# Patient Record
Sex: FEMALE | Race: ASIAN | NOT HISPANIC OR LATINO
[De-identification: names, ages, dates, MRNs, and addresses within clinical notes are randomized per-mention and may not be internally consistent; named-entity substitution may affect disease eponyms.]

---

## 2017-11-01 ENCOUNTER — RESULT REVIEW (OUTPATIENT)
Age: 42
End: 2017-11-01

## 2020-11-09 ENCOUNTER — RESULT REVIEW (OUTPATIENT)
Age: 45
End: 2020-11-09

## 2021-08-25 ENCOUNTER — APPOINTMENT (OUTPATIENT)
Dept: SURGICAL ONCOLOGY | Facility: CLINIC | Age: 46
End: 2021-08-25
Payer: MEDICAID

## 2021-08-25 VITALS
WEIGHT: 127 LBS | BODY MASS INDEX: 24.94 KG/M2 | HEART RATE: 91 BPM | OXYGEN SATURATION: 97 % | RESPIRATION RATE: 16 BRPM | HEIGHT: 60 IN | SYSTOLIC BLOOD PRESSURE: 148 MMHG | DIASTOLIC BLOOD PRESSURE: 82 MMHG

## 2021-08-25 DIAGNOSIS — Z78.9 OTHER SPECIFIED HEALTH STATUS: ICD-10-CM

## 2021-08-25 DIAGNOSIS — D24.1 BENIGN NEOPLASM OF RIGHT BREAST: ICD-10-CM

## 2021-08-25 PROCEDURE — 99204 OFFICE O/P NEW MOD 45 MIN: CPT

## 2021-08-25 RX ORDER — AMLODIPINE BESYLATE 5 MG/1
TABLET ORAL
Refills: 0 | Status: ACTIVE | COMMUNITY

## 2021-08-25 NOTE — PHYSICAL EXAM
[Normocephalic] : normocephalic [Atraumatic] : atraumatic [EOMI] : extra ocular movement intact [PERRL] : pupils equal, round and reactive to light [Sclera nonicteric] : sclera nonicteric [Supple] : supple [No Supraclavicular Adenopathy] : no supraclavicular adenopathy [No Cervical Adenopathy] : no cervical adenopathy [Examined in the supine and seated position] : examined in the supine and seated position [Symmetrical] : symmetrical [Bra Size: ___] : Bra Size: [unfilled] [Grade 2] : Ptosis Grade 2 [No dominant masses] : no dominant masses left breast [No Nipple Retraction] : no left nipple retraction [No Nipple Discharge] : no left nipple discharge [Breast Nipple Inversion] : nipples not inverted [Breast Nipple Retraction] : nipples not retracted [Breast Nipple Flattening] : nipples flattened [Breast Nipple Fissures] : nipples not fissured [Breast Abnormal Lactation (Galactorrhea)] : no galactorrhea [Breast Abnormal Secretion Bloody Fluid] : no bloody discharge [Breast Abnormal Secretion Serous Fluid] : no serous discharge [Breast Abnormal Secretion Opalescent Fluid] : no milky discharge [No Axillary Lymphadenopathy] : no left axillary lymphadenopathy [No Edema] : no edema [No Swelling] : no swelling [Full ROM] : full range of motion [de-identified] : non-labored respirations  [de-identified] : superficial accessory fatty tissue in axilla [de-identified] : 1-2 cm mass in lower inner breast

## 2021-08-25 NOTE — HISTORY OF PRESENT ILLNESS
[FreeTextEntry1] : The patient is a 46 year old female referred for consultation by Dr. Melquiades Villarreal for Right breast ADH\par \par The patient reports that she's noticed a right breast mass for approximately 20 years that has not changed. She underwent recent screening imaging without any breast complaints.\par \par Most recent imaging:\par 5/26/2021 B/L SM ([facility]) revealed heterogeneously dense breasts \par  - R 5:00 N6 multilobulated mass 21 x 24 mm, unchanged\par  - R lower outer breast 6 x 8 mm, faint nodule\par  - L negative\par \par 5/26/2021 B/L US\par  - R 12:00 N5 3 x 5 x 2 mm cyst -> 6 mo f/u\par  - R 5:00 N4 2.4 x 1.1 x 2.1 cm hypoechoic mass ->bx\par  - R 8:00 N5 8 x 5 x 8 mm hypoechoic mass, complicated cyst vs FA -> 6 mo F/u\par  - R 11:00 N4 6 x 5 x 6 mm hypoechoic nodule -> bx\par  - L 8:00 N7 3 mm cyst\par  - BR4\par \par 7/12/2021 R USG-CNB ([facility])\par  - R 5:00 N4 (R clip) - ADH\par  - R 11:00 N4 (S clip) - FA\par \par Today, the patient reports no breast complaints. Denies pain, masses, skin changes, or nipple discharge.

## 2021-08-25 NOTE — ASSESSMENT
[FreeTextEntry1] : The patient is a 46 year old female with R breast ADH and FA found on core needle biopsy.\par \par We discussed the implications of atypical hyperplasia at length in the office today, first discussing the increased breast cancer risk, which results in a 2-3 fold increase lifelong and implications it has for her in terms of high-risk screening. Patients should undergo yearly imaging and clinical exam, and consideration can be given to the additional use of breast MRI screening. The patient should also meet with a Medical Oncologist to consider the risks and benefits to taking oral breast cancer- risk-reducing medications.\par \par We discussed that upon surgical excision 15-20% of cases are upgraded on excision, and thus excision is currently recommended.\par  \par We discussed that atypical hyperplasia is not a pre-cancerous or cancerous condition, but is an indication for breast cancer risk.\par \par For the fibroadenoma, we discussed that fibroadenomas are the most common benign tumor in the breast, accounting for one-half of all breast biopsies.\par We discussed indications for observation vs. excision of fibroadenomas. If a breast mass is benign on radiologic appearance, clinical exam and needle biopsy, it is safe to observe these lesions with repeat imaging q6 months x2 years--if the lesion remains unchanged or regresses, at that time the patient can be safely returned to routine breast care. If the lesion grows or changes in configuration, excision is recommended, as it also is for cases where there is discordance between these three characteristics.\par  \par We discussed that fibroadenomas are not signs of unusual breast activity or breast cancer risk. \par \par While the mass is palpable on exam, it is small and difficult to appreciate, I discussed that we will place a MagSeed to help with definitive localization during surgery.\par \par Preoperative, intraoperative, and postoperative considerations were reviewed.  Risks, benefits, and alternatives to proposed surgical procedure were reviewed and all questions were answered to her satisfaction.\par \par Plan:\par  - Magseed\par  - path/slide review requested\par  - R breast excisional biopsy with Magseed\par

## 2021-08-25 NOTE — PAST MEDICAL HISTORY
[Perimenopausal] : The patient is perimenopausal [Menarche Age ____] : age at menarche was [unfilled] [History of Hormone Replacement Treatment] : has no history of hormone replacement treatment [Approximately ___] : the LMP was approximately [unfilled] [Irregular Cycle Intervals] : are  irregular [Total Preg ___] : G[unfilled] [Live Births ___] : P[unfilled]  [Age At Live Birth ___] : Age at live birth: [unfilled] [FreeTextEntry5] : 3 c-sections [FreeTextEntry2] : 3 children, 17, 15, and 6 (special needs) [FreeTextEntry6] : none [FreeTextEntry7] : none [FreeTextEntry8] : 2 months

## 2021-08-25 NOTE — CONSULT LETTER
[Dear  ___] : Dear  [unfilled], [Consult Letter:] : I had the pleasure of evaluating your patient, [unfilled]. [Please see my note below.] : Please see my note below. [Consult Closing:] : Thank you very much for allowing me to participate in the care of this patient.  If you have any questions, please do not hesitate to contact me. [Sincerely,] : Sincerely, [FreeTextEntry3] : Balbina Forrester MD\par Breast Surgeon\par Division of Surgical Oncology\par Department of Surgery\par [address] [place] [address]\[place] [address] \par Tel: [phone]\par Fax: [phone]\par Email: [email or web address]

## 2021-09-02 ENCOUNTER — OUTPATIENT (OUTPATIENT)
Dept: OUTPATIENT SERVICES | Facility: HOSPITAL | Age: 46
LOS: 1 days | End: 2021-09-02
Payer: MEDICAID

## 2021-09-02 ENCOUNTER — RESULT REVIEW (OUTPATIENT)
Age: 46
End: 2021-09-02

## 2021-09-02 DIAGNOSIS — N60.91 UNSPECIFIED BENIGN MAMMARY DYSPLASIA OF RIGHT BREAST: ICD-10-CM

## 2021-09-02 DIAGNOSIS — D24.1 BENIGN NEOPLASM OF RIGHT BREAST: ICD-10-CM

## 2021-09-02 PROCEDURE — 88321 CONSLTJ&REPRT SLD PREP ELSWR: CPT

## 2021-09-15 ENCOUNTER — OUTPATIENT (OUTPATIENT)
Dept: OUTPATIENT SERVICES | Facility: HOSPITAL | Age: 46
LOS: 1 days | End: 2021-09-15
Payer: MEDICAID

## 2021-09-15 VITALS
RESPIRATION RATE: 16 BRPM | HEIGHT: 58.5 IN | OXYGEN SATURATION: 99 % | SYSTOLIC BLOOD PRESSURE: 140 MMHG | TEMPERATURE: 97 F | DIASTOLIC BLOOD PRESSURE: 80 MMHG | HEART RATE: 80 BPM | WEIGHT: 128.97 LBS

## 2021-09-15 DIAGNOSIS — T78.40XA ALLERGY, UNSPECIFIED, INITIAL ENCOUNTER: ICD-10-CM

## 2021-09-15 DIAGNOSIS — Z98.891 HISTORY OF UTERINE SCAR FROM PREVIOUS SURGERY: Chronic | ICD-10-CM

## 2021-09-15 DIAGNOSIS — N63.10 UNSPECIFIED LUMP IN THE RIGHT BREAST, UNSPECIFIED QUADRANT: ICD-10-CM

## 2021-09-15 DIAGNOSIS — Z87.01 PERSONAL HISTORY OF PNEUMONIA (RECURRENT): Chronic | ICD-10-CM

## 2021-09-15 DIAGNOSIS — I10 ESSENTIAL (PRIMARY) HYPERTENSION: ICD-10-CM

## 2021-09-15 DIAGNOSIS — N60.91 UNSPECIFIED BENIGN MAMMARY DYSPLASIA OF RIGHT BREAST: ICD-10-CM

## 2021-09-15 LAB
ANION GAP SERPL CALC-SCNC: 13 MMOL/L — SIGNIFICANT CHANGE UP (ref 7–14)
BUN SERPL-MCNC: 11 MG/DL — SIGNIFICANT CHANGE UP (ref 7–23)
CALCIUM SERPL-MCNC: 10.4 MG/DL — SIGNIFICANT CHANGE UP (ref 8.4–10.5)
CHLORIDE SERPL-SCNC: 101 MMOL/L — SIGNIFICANT CHANGE UP (ref 98–107)
CO2 SERPL-SCNC: 27 MMOL/L — SIGNIFICANT CHANGE UP (ref 22–31)
CREAT SERPL-MCNC: 0.65 MG/DL — SIGNIFICANT CHANGE UP (ref 0.5–1.3)
GLUCOSE SERPL-MCNC: 107 MG/DL — HIGH (ref 70–99)
HCG SERPL-ACNC: <5 MIU/ML — SIGNIFICANT CHANGE UP
HCG UR QL: NEGATIVE — SIGNIFICANT CHANGE UP
HCT VFR BLD CALC: 36.5 % — SIGNIFICANT CHANGE UP (ref 34.5–45)
HGB BLD-MCNC: 12.9 G/DL — SIGNIFICANT CHANGE UP (ref 11.5–15.5)
MCHC RBC-ENTMCNC: 28.9 PG — SIGNIFICANT CHANGE UP (ref 27–34)
MCHC RBC-ENTMCNC: 35.3 GM/DL — SIGNIFICANT CHANGE UP (ref 32–36)
MCV RBC AUTO: 81.8 FL — SIGNIFICANT CHANGE UP (ref 80–100)
NRBC # BLD: 0 /100 WBCS — SIGNIFICANT CHANGE UP
NRBC # FLD: 0 K/UL — SIGNIFICANT CHANGE UP
PLATELET # BLD AUTO: 316 K/UL — SIGNIFICANT CHANGE UP (ref 150–400)
POTASSIUM SERPL-MCNC: 3 MMOL/L — LOW (ref 3.5–5.3)
POTASSIUM SERPL-SCNC: 3 MMOL/L — LOW (ref 3.5–5.3)
RBC # BLD: 4.46 M/UL — SIGNIFICANT CHANGE UP (ref 3.8–5.2)
RBC # FLD: 13.6 % — SIGNIFICANT CHANGE UP (ref 10.3–14.5)
SODIUM SERPL-SCNC: 141 MMOL/L — SIGNIFICANT CHANGE UP (ref 135–145)
WBC # BLD: 7.13 K/UL — SIGNIFICANT CHANGE UP (ref 3.8–10.5)
WBC # FLD AUTO: 7.13 K/UL — SIGNIFICANT CHANGE UP (ref 3.8–10.5)

## 2021-09-15 PROCEDURE — 93010 ELECTROCARDIOGRAM REPORT: CPT

## 2021-09-15 NOTE — H&P PST ADULT - NSICDXPASTMEDICALHX_GEN_ALL_CORE_FT
PAST MEDICAL HISTORY:  COVID 2020    History of root canal procedure 21 ; pt rescribed amoxicillin dc d day  due to " pimples on my tongue "    Hypertension     Pneumonia 2015 s/p  section     PAST MEDICAL HISTORY:  COVID 2020    History of root canal procedure 21 ; pt prescribed amoxicillin dc d day  due to " pimples on my tongue "    Hypertension     Pneumonia 2015 s/p  section

## 2021-09-15 NOTE — H&P PST ADULT - HISTORY OF PRESENT ILLNESS
Pt is a 46 y.o. female ; pt s/p mammogram, s/p sonogram ; pt reports right breast lump ; pt to surgeon ; pt now presents for Right Breast Excisional Biopsy with magseed Localization    Pt denies breast pain , denies nipple discharge  Pt is a 46 y.o. female ; pt s/p mammogram, s/p sonogram ; pt reports right breast lump ; pt to surgeon ; pt now presents for Right Breast Excisional Biopsy with Magseed Localization    Pt denies breast pain , denies nipple discharge

## 2021-09-15 NOTE — H&P PST ADULT - NSSUBSTANCEUSE_GEN_ALL_CORE_SD
Dr. Josh Hooker, please advise on Dexa Scan results. Patient is requesting to speak to you about them. Thanks! pt denies drug use

## 2021-09-15 NOTE — H&P PST ADULT - BREASTS COMMENTS
right breast mass ; surgeon wi9th original studies ; per pt unable to palpate right breast mass ; surgeon with original studies ; per pt unable to palpate

## 2021-09-15 NOTE — H&P PST ADULT - NSANTHOSAYNRD_GEN_A_CORE
No. OTTO screening performed.  STOP BANG Legend: 0-2 = LOW Risk; 3-4 = INTERMEDIATE Risk; 5-8 = HIGH Risk

## 2021-09-15 NOTE — H&P PST ADULT - PROBLEM SELECTOR PLAN 1
Right Breast Excisional Biopsy with Magseed Localization    Pre op instructions reviewed with pt ; pt verbalized good understanding of pre op instructions    Pt aware Covid test to be scheduled

## 2021-09-20 ENCOUNTER — APPOINTMENT (OUTPATIENT)
Dept: DISASTER EMERGENCY | Facility: CLINIC | Age: 46
End: 2021-09-20

## 2021-09-20 ENCOUNTER — LABORATORY RESULT (OUTPATIENT)
Age: 46
End: 2021-09-20

## 2021-09-20 PROBLEM — U07.1 COVID-19: Chronic | Status: ACTIVE | Noted: 2021-09-15

## 2021-09-20 PROBLEM — J18.9 PNEUMONIA, UNSPECIFIED ORGANISM: Chronic | Status: ACTIVE | Noted: 2021-09-15

## 2021-09-20 PROBLEM — I10 ESSENTIAL (PRIMARY) HYPERTENSION: Chronic | Status: ACTIVE | Noted: 2021-09-15

## 2021-09-20 PROBLEM — Z98.890 OTHER SPECIFIED POSTPROCEDURAL STATES: Chronic | Status: ACTIVE | Noted: 2021-09-15

## 2021-09-22 ENCOUNTER — RESULT REVIEW (OUTPATIENT)
Age: 46
End: 2021-09-22

## 2021-09-22 ENCOUNTER — OUTPATIENT (OUTPATIENT)
Dept: OUTPATIENT SERVICES | Facility: HOSPITAL | Age: 46
LOS: 1 days | End: 2021-09-22
Payer: MEDICAID

## 2021-09-22 ENCOUNTER — APPOINTMENT (OUTPATIENT)
Dept: ULTRASOUND IMAGING | Facility: IMAGING CENTER | Age: 46
End: 2021-09-22
Payer: MEDICAID

## 2021-09-22 ENCOUNTER — TRANSCRIPTION ENCOUNTER (OUTPATIENT)
Age: 46
End: 2021-09-22

## 2021-09-22 VITALS
TEMPERATURE: 97 F | DIASTOLIC BLOOD PRESSURE: 74 MMHG | OXYGEN SATURATION: 99 % | SYSTOLIC BLOOD PRESSURE: 117 MMHG | WEIGHT: 128.97 LBS | HEIGHT: 58.5 IN | HEART RATE: 82 BPM | RESPIRATION RATE: 16 BRPM

## 2021-09-22 DIAGNOSIS — Z98.891 HISTORY OF UTERINE SCAR FROM PREVIOUS SURGERY: Chronic | ICD-10-CM

## 2021-09-22 DIAGNOSIS — Z00.8 ENCOUNTER FOR OTHER GENERAL EXAMINATION: ICD-10-CM

## 2021-09-22 PROCEDURE — 19285 PERQ DEV BREAST 1ST US IMAG: CPT

## 2021-09-22 PROCEDURE — 19285 PERQ DEV BREAST 1ST US IMAG: CPT | Mod: RT

## 2021-09-22 PROCEDURE — C1739: CPT

## 2021-09-23 ENCOUNTER — OUTPATIENT (OUTPATIENT)
Dept: OUTPATIENT SERVICES | Facility: HOSPITAL | Age: 46
LOS: 1 days | Discharge: HOME | End: 2021-09-23
Payer: MEDICAID

## 2021-09-23 ENCOUNTER — RESULT REVIEW (OUTPATIENT)
Age: 46
End: 2021-09-23

## 2021-09-23 ENCOUNTER — APPOINTMENT (OUTPATIENT)
Dept: MAMMOGRAPHY | Facility: IMAGING CENTER | Age: 46
End: 2021-09-23
Payer: MEDICAID

## 2021-09-23 ENCOUNTER — APPOINTMENT (OUTPATIENT)
Dept: SURGICAL ONCOLOGY | Facility: AMBULATORY SURGERY CENTER | Age: 46
End: 2021-09-23

## 2021-09-23 ENCOUNTER — OUTPATIENT (OUTPATIENT)
Dept: OUTPATIENT SERVICES | Facility: HOSPITAL | Age: 46
LOS: 1 days | End: 2021-09-23
Payer: COMMERCIAL

## 2021-09-23 VITALS
TEMPERATURE: 98 F | DIASTOLIC BLOOD PRESSURE: 82 MMHG | HEART RATE: 81 BPM | SYSTOLIC BLOOD PRESSURE: 116 MMHG | OXYGEN SATURATION: 96 % | RESPIRATION RATE: 15 BRPM

## 2021-09-23 DIAGNOSIS — N60.91 UNSPECIFIED BENIGN MAMMARY DYSPLASIA OF RIGHT BREAST: ICD-10-CM

## 2021-09-23 DIAGNOSIS — Z00.8 ENCOUNTER FOR OTHER GENERAL EXAMINATION: ICD-10-CM

## 2021-09-23 DIAGNOSIS — Z98.891 HISTORY OF UTERINE SCAR FROM PREVIOUS SURGERY: Chronic | ICD-10-CM

## 2021-09-23 LAB
POTASSIUM SERPL-MCNC: 3 MMOL/L — LOW (ref 3.5–5.3)
POTASSIUM SERPL-SCNC: 3 MMOL/L — LOW (ref 3.5–5.3)

## 2021-09-23 PROCEDURE — 76098 X-RAY EXAM SURGICAL SPECIMEN: CPT | Mod: 26

## 2021-09-23 PROCEDURE — 88307 TISSUE EXAM BY PATHOLOGIST: CPT | Mod: 26

## 2021-09-23 PROCEDURE — 76098 X-RAY EXAM SURGICAL SPECIMEN: CPT

## 2021-09-23 PROCEDURE — 88360 TUMOR IMMUNOHISTOCHEM/MANUAL: CPT | Mod: 26

## 2021-09-23 PROCEDURE — 19125 EXCISION BREAST LESION: CPT

## 2021-09-23 RX ORDER — AMOXICILLIN 250 MG/5ML
1 SUSPENSION, RECONSTITUTED, ORAL (ML) ORAL
Qty: 0 | Refills: 0 | DISCHARGE

## 2021-09-23 RX ORDER — AMLODIPINE BESYLATE 2.5 MG/1
1 TABLET ORAL
Qty: 0 | Refills: 0 | DISCHARGE

## 2021-09-23 NOTE — ASU DISCHARGE PLAN (ADULT/PEDIATRIC) - MODE OF TRANSPORTATION
Detail Level: Detailed
Initiate Treatment: She has fluorouracil 5% BID x 2 weeks to the chest
Ambulatory

## 2021-09-23 NOTE — BRIEF OPERATIVE NOTE - NSICDXBRIEFPREOP_GEN_ALL_CORE_FT
PRE-OP DIAGNOSIS:  Atypical ductal hyperplasia of right breast 23-Sep-2021 16:45:16  Lynne Cardozo

## 2021-09-23 NOTE — ASU DISCHARGE PLAN (ADULT/PEDIATRIC) - ASU DC SPECIAL INSTRUCTIONSFT
Please make an appointment to see Dr. Forrester in 1- 2 weeks.    You may take Tylenol as needed for pain control.

## 2021-09-23 NOTE — ASU DISCHARGE PLAN (ADULT/PEDIATRIC) - CARE PROVIDER_API CALL
Balbina Forrester)  Surgery  [address] [place] [id number]  Phone: [phone]  Fax: [phone]  Follow Up Time:

## 2021-09-28 PROBLEM — N60.91 ATYPICAL DUCTAL HYPERPLASIA OF RIGHT BREAST: Status: RESOLVED | Noted: 2021-08-25 | Resolved: 2021-09-28

## 2021-09-29 ENCOUNTER — APPOINTMENT (OUTPATIENT)
Dept: SURGICAL ONCOLOGY | Facility: CLINIC | Age: 46
End: 2021-09-29
Payer: MEDICAID

## 2021-09-29 VITALS
DIASTOLIC BLOOD PRESSURE: 81 MMHG | HEART RATE: 94 BPM | WEIGHT: 127 LBS | BODY MASS INDEX: 24.94 KG/M2 | OXYGEN SATURATION: 97 % | HEIGHT: 60 IN | SYSTOLIC BLOOD PRESSURE: 148 MMHG | RESPIRATION RATE: 17 BRPM

## 2021-09-29 DIAGNOSIS — R21 RASH AND OTHER NONSPECIFIC SKIN ERUPTION: ICD-10-CM

## 2021-09-29 DIAGNOSIS — N60.91 UNSPECIFIED BENIGN MAMMARY DYSPLASIA OF RIGHT BREAST: ICD-10-CM

## 2021-09-29 LAB — SURGICAL PATHOLOGY STUDY: SIGNIFICANT CHANGE UP

## 2021-09-29 PROCEDURE — 99024 POSTOP FOLLOW-UP VISIT: CPT

## 2021-09-29 RX ORDER — HYDROCORTISONE 1 G/100G
1 CREAM TOPICAL TWICE DAILY
Qty: 1 | Refills: 0 | Status: ACTIVE | COMMUNITY
Start: 2021-09-29 | End: 1900-01-01

## 2021-09-29 NOTE — CONSULT LETTER
[Dear  ___] : Dear  [unfilled], [Courtesy Letter:] : I had the pleasure of seeing your patient, [unfilled], in my office today. [Please see my note below.] : Please see my note below. [Consult Closing:] : Thank you very much for allowing me to participate in the care of this patient.  If you have any questions, please do not hesitate to contact me. [Sincerely,] : Sincerely, [FreeTextEntry3] : Balbina Forrester MD\par Breast Surgeon\par Division of Surgical Oncology\par Department of Surgery\par [address] [place] [address]\[place] [address] \par Tel: [phone]\par Fax: [phone]\par Email: [email or web address]

## 2021-09-29 NOTE — ASSESSMENT
[FreeTextEntry1] : The patient is a 46 year old female with R breast ADH s/p excisional biopsy with Magseed localization 9/23/2021.\par \par Postop course noted for some bleeding from incision, stopped with pressure. No evidence of hematoma or seroma today.\par \par Path still pending.\par \par Plan:\par  - will call with pathology results\par  - R US 11/21\par  - 5/2022 B/L SM and US\par  - RTO after imaging\par \par

## 2021-09-29 NOTE — HISTORY OF PRESENT ILLNESS
[FreeTextEntry1] : The patient is a 46 year old female with Right breast ADH s/p R excisional biopsy with MagSeed Localization 9/23/2021.\par \par Prior history:\par The patient reports that she's noticed a right breast mass for approximately 20 years that has not changed. She underwent recent screening imaging without any breast complaints.\par \par Most recent imaging:\par 5/26/2021 B/L SM ([facility]) revealed heterogeneously dense breasts \par  - R 5:00 N6 multilobulated mass 21 x 24 mm, unchanged\par  - R lower outer breast 6 x 8 mm, faint nodule\par  - L negative\par \par 5/26/2021 B/L US\par  - R 12:00 N5 3 x 5 x 2 mm cyst -> 6 mo f/u\par  - R 5:00 N4 2.4 x 1.1 x 2.1 cm hypoechoic mass ->bx\par  - R 8:00 N5 8 x 5 x 8 mm hypoechoic mass, complicated cyst vs FA -> 6 mo F/u\par  - R 11:00 N4 6 x 5 x 6 mm hypoechoic nodule -> bx\par  - L 8:00 N7 3 mm cyst\par  - BR4\par \par 7/12/2021 R USG-CNB ([facility])\par  - R 5:00 N4 (R clip) - ADH\par  - R 11:00 N4 (S clip) - FA\par \par 9/23/2021 R breast excisional biopsy with Magseed localization: Path pending\par \par Interval history:\par  - The patient reports that the day after surgery, she noticed bleeding from the surgical site underneath her dressing. She was too nervous to take off the dressing, but held pressure. She was unable to contact anyone at the office (tried to call, but did not get through, did not speak to emergency line). The bleeding did not continue so the patient kept her postop appointment. Denies fevers, chills. Was taking Tylenol once a day for pain, but no longer needing that. \par \par The patient also notes a rash over the area of surgery-itchy.

## 2021-09-29 NOTE — PHYSICAL EXAM
[Normocephalic] : normocephalic [Atraumatic] : atraumatic [EOMI] : extra ocular movement intact [PERRL] : pupils equal, round and reactive to light [Sclera nonicteric] : sclera nonicteric [Supple] : supple [No Supraclavicular Adenopathy] : no supraclavicular adenopathy [No Cervical Adenopathy] : no cervical adenopathy [de-identified] : non-labored respirations  [de-identified] : inferior circumareolar incision intact, hematoma noted on top of skin underneath surgical dressing. No active bleeding at this time. Some ecchymosis, no seroma, no fluctance. No erythema

## 2021-10-05 ENCOUNTER — NON-APPOINTMENT (OUTPATIENT)
Age: 46
End: 2021-10-05

## 2021-10-09 ENCOUNTER — OUTPATIENT (OUTPATIENT)
Dept: OUTPATIENT SERVICES | Facility: HOSPITAL | Age: 46
LOS: 1 days | Discharge: HOME | End: 2021-10-09

## 2021-10-09 DIAGNOSIS — Z98.891 HISTORY OF UTERINE SCAR FROM PREVIOUS SURGERY: Chronic | ICD-10-CM

## 2021-10-09 DIAGNOSIS — D64.9 ANEMIA, UNSPECIFIED: ICD-10-CM

## 2021-10-13 ENCOUNTER — APPOINTMENT (OUTPATIENT)
Dept: HEMATOLOGY ONCOLOGY | Facility: CLINIC | Age: 46
End: 2021-10-13
Payer: MEDICAID

## 2021-10-13 VITALS
BODY MASS INDEX: 24.99 KG/M2 | SYSTOLIC BLOOD PRESSURE: 134 MMHG | OXYGEN SATURATION: 99 % | HEIGHT: 59.45 IN | HEART RATE: 90 BPM | RESPIRATION RATE: 16 BRPM | WEIGHT: 125.64 LBS | DIASTOLIC BLOOD PRESSURE: 78 MMHG | TEMPERATURE: 97.7 F

## 2021-10-13 PROCEDURE — 99205 OFFICE O/P NEW HI 60 MIN: CPT

## 2021-10-19 ENCOUNTER — APPOINTMENT (OUTPATIENT)
Dept: RADIATION ONCOLOGY | Facility: CLINIC | Age: 46
End: 2021-10-19

## 2021-10-19 NOTE — HISTORY OF PRESENT ILLNESS
[T: ___] : T[unfilled] [N: ___] : N[unfilled] [M: ___] : M[unfilled] [AJCC Stage: ____] : AJCC Stage: [unfilled] [de-identified] : Ms.Parvin Hernadez is a 46 year old female here for an evaluation of breast cancer. Her oncologic history is as follows:\par \par She underwent routine breast imaging on 5/26/21 (BIRADS 4) the mammo showed heterogeneously dense breasts, a  right 5:00 N6 multilobulated mass 21 x 24 mm, unchanged and  a right lower outer breast 6 x 8 mm, faint nodule. The left breast was  negative. The US revealed multiple right breast masses. The largest nodule on the right is at 5:00 4 cm FN and there is a  nodule on the right at 11:00 4 cm FN for which biopsies are rec. 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

## 2021-10-19 NOTE — PHYSICAL EXAM
[Fully active, able to carry on all pre-disease performance without restriction] : Status 0 - Fully active, able to carry on all pre-disease performance without restriction [Normal] : bilateral breasts without nipple retraction, skin dimpling or palpable masses; the bilateral axillae are without adenopathy [de-identified] : healed lumpectomy scar

## 2021-10-19 NOTE — OB HISTORY
[Regular Cycle Intervals] : periods have been regular [Currently In Menopause] : not currently in menopause

## 2021-10-19 NOTE — REASON FOR VISIT
[Other: _____] : [unfilled] [[employer] ] : provided by [employer]   [FreeTextEntry2] : ER/MA + Right breast Ductal carcinoma in situ  [FreeTextEntry3] : ISRAT [id number]

## 2021-10-25 ENCOUNTER — APPOINTMENT (OUTPATIENT)
Dept: MRI IMAGING | Facility: CLINIC | Age: 46
End: 2021-10-25

## 2021-11-10 ENCOUNTER — NON-APPOINTMENT (OUTPATIENT)
Age: 46
End: 2021-11-10

## 2022-01-22 ENCOUNTER — OUTPATIENT (OUTPATIENT)
Dept: OUTPATIENT SERVICES | Facility: HOSPITAL | Age: 47
LOS: 1 days | Discharge: HOME | End: 2022-01-22

## 2022-01-22 DIAGNOSIS — Z98.891 HISTORY OF UTERINE SCAR FROM PREVIOUS SURGERY: Chronic | ICD-10-CM

## 2022-01-22 DIAGNOSIS — D64.9 ANEMIA, UNSPECIFIED: ICD-10-CM

## 2022-01-25 ENCOUNTER — LABORATORY RESULT (OUTPATIENT)
Age: 47
End: 2022-01-25

## 2022-01-25 ENCOUNTER — APPOINTMENT (OUTPATIENT)
Dept: HEMATOLOGY ONCOLOGY | Facility: CLINIC | Age: 47
End: 2022-01-25

## 2022-01-25 NOTE — DISCUSSION/SUMMARY
[FreeTextEntry1] : REASON FOR CONSULT\par Zoraida Hernadez is a 47-year-old female who was referred by Dr. Maria Del Carmen Zhu for cancer genetic counseling and risk assessment due to a recent diagnosis of breast cancer. \par \par RELEVANT MEDICAL HISTORY\par Ms. Hernadez was diagnosed with right breast cancer (DCIS, ER+/NH+) in 2021 at age 46. She was treated with excisional biopsy 2021. \par \par Radiation oncology consult was requested by Dr. Zhu. Ms. Hernadez previously cancelled this appointment stating she does not want additional therapy as she feels fine and needs to take care of her son with special needs. Ms. Hernadez was encouraged to schedule the follow-up to discuss treatment, she stated she will reschedule the rad onc appointment. In addition, Ms. Hernadez stated she is taking pomegranate seed oil supplements instead of Tamoxifen. She was encouraged to follow-up with Dr. Zhu regarding Tamoxifen use. \par \par OTHER MEDICAL AND SURGICAL HISTORY:\par HTN. C-sections x3. Right breast biopsies 2021 – ADH involving fibroadenoma and fibroadenoma. \par \par PAST OB/GYN HISTORY:\par Obstetrical History: \par Age at Menarche: 8 or 9\par Perimenopausal/Unsure (LMP 2021) \par Age at First Live Birth: 27\par Oral Contraceptive Use: No\par Hormone Replacement Therapy: No\par \par CANCER SCREENING HISTORY:  \par GYN: Last visit last week, normal. Frequency: yearly\par Colon: None. \par Skin: None. No concerns reported today. \par \par SOCIAL HISTORY:\par •	\par •	Tobacco-product use: No\par \par FAMILY HISTORY:\par Maternal and paternal ancestry was reported as [place]. No Ashkenazi Advent heritage reported. A detailed family history of cancer was ascertained, see below and scanned chart for pedigree. \par \par To Ms. Hernadez’s knowledge, no one in the family has had germline testing for cancer susceptibility.  \par 	\par 	RISK ASSESSMENT:\par Ms. Hernadez’s recent diagnosis of breast cancer may be suggestive of more than one hereditary cancer predisposition syndrome. Her treatment is still pending and per patient, results may impact plan. We recommended genetic testing for a breast cancer panel and guidelines-based colorectal cancer panel. This test analyzes 29 genes: APC, BENJAMIN, AXIN2, BARD1, BMPR1A, BRCA1, BRCA2, BRIP1, CDH1, CHEK2, EPCAM, GREM1, MLH1, MSH2, MSH3, MSH6, MUTYH, NF1, NTHL1, PALB2, PMS2,\par POLD1, POLE, PTEN, RAD51C, RAD51D, SMAD4, STK11, TP53.\par \par We discussed the risks, benefits and limitations, and implications of genetic testing. We also discussed the psychosocial implications of genetic testing. Possible test results were reviewed with Ms. Hernadez, along with associated medical management options. The Genetic Information Non-discrimination Act ([place]ARLING) was also reviewed.\par \par Ms. Hernadez consented to the above-mentioned genetic testing panel. Blood was drawn in our laboratory and sent to [facility] today.\par \par PLAN:\par \par 1.	Blood drawn today will be sent to [facility] for analysis. \par 2.	We will contact Ms. Hernadez to schedule a follow-up appointment once the results are available. Results generally return in 2-3 weeks. \par \par \par For any additional questions please call Cancer Genetics at [phone]. \par \par \par Frederick Goncalves, MS, [facility]\par Genetic Counselor, Cancer Genetics\par \par \par CC: \par Dr. Maria Del Carmen Zhu

## 2022-02-09 ENCOUNTER — RESULT REVIEW (OUTPATIENT)
Age: 47
End: 2022-02-09

## 2022-02-09 ENCOUNTER — APPOINTMENT (OUTPATIENT)
Dept: HEMATOLOGY ONCOLOGY | Facility: CLINIC | Age: 47
End: 2022-02-09
Payer: MEDICAID

## 2022-02-09 VITALS
HEIGHT: 59.45 IN | BODY MASS INDEX: 24.56 KG/M2 | WEIGHT: 123.46 LBS | SYSTOLIC BLOOD PRESSURE: 138 MMHG | HEART RATE: 89 BPM | DIASTOLIC BLOOD PRESSURE: 80 MMHG | RESPIRATION RATE: 16 BRPM | TEMPERATURE: 97.2 F | OXYGEN SATURATION: 99 %

## 2022-02-09 LAB
BASOPHILS # BLD AUTO: 0.04 K/UL — SIGNIFICANT CHANGE UP (ref 0–0.2)
BASOPHILS NFR BLD AUTO: 0.6 % — SIGNIFICANT CHANGE UP (ref 0–2)
EOSINOPHIL # BLD AUTO: 0.11 K/UL — SIGNIFICANT CHANGE UP (ref 0–0.5)
EOSINOPHIL NFR BLD AUTO: 1.8 % — SIGNIFICANT CHANGE UP (ref 0–6)
HCT VFR BLD CALC: 38.9 % — SIGNIFICANT CHANGE UP (ref 34.5–45)
HGB BLD-MCNC: 13 G/DL — SIGNIFICANT CHANGE UP (ref 11.5–15.5)
IMM GRANULOCYTES NFR BLD AUTO: 0.2 % — SIGNIFICANT CHANGE UP (ref 0–1.5)
LYMPHOCYTES # BLD AUTO: 1.78 K/UL — SIGNIFICANT CHANGE UP (ref 1–3.3)
LYMPHOCYTES # BLD AUTO: 28.8 % — SIGNIFICANT CHANGE UP (ref 13–44)
MCHC RBC-ENTMCNC: 28.1 PG — SIGNIFICANT CHANGE UP (ref 27–34)
MCHC RBC-ENTMCNC: 33.4 G/DL — SIGNIFICANT CHANGE UP (ref 32–36)
MCV RBC AUTO: 84 FL — SIGNIFICANT CHANGE UP (ref 80–100)
MONOCYTES # BLD AUTO: 0.52 K/UL — SIGNIFICANT CHANGE UP (ref 0–0.9)
MONOCYTES NFR BLD AUTO: 8.4 % — SIGNIFICANT CHANGE UP (ref 2–14)
NEUTROPHILS # BLD AUTO: 3.72 K/UL — SIGNIFICANT CHANGE UP (ref 1.8–7.4)
NEUTROPHILS NFR BLD AUTO: 60.2 % — SIGNIFICANT CHANGE UP (ref 43–77)
NRBC # BLD: 0 /100 WBCS — SIGNIFICANT CHANGE UP (ref 0–0)
PLATELET # BLD AUTO: 344 K/UL — SIGNIFICANT CHANGE UP (ref 150–400)
RBC # BLD: 4.63 M/UL — SIGNIFICANT CHANGE UP (ref 3.8–5.2)
RBC # FLD: 13.9 % — SIGNIFICANT CHANGE UP (ref 10.3–14.5)
WBC # BLD: 6.18 K/UL — SIGNIFICANT CHANGE UP (ref 3.8–10.5)
WBC # FLD AUTO: 6.18 K/UL — SIGNIFICANT CHANGE UP (ref 3.8–10.5)

## 2022-02-09 PROCEDURE — 99214 OFFICE O/P EST MOD 30 MIN: CPT

## 2022-02-10 ENCOUNTER — NON-APPOINTMENT (OUTPATIENT)
Age: 47
End: 2022-02-10

## 2022-02-10 LAB
ALBUMIN SERPL ELPH-MCNC: 4.7 G/DL
ALP BLD-CCNC: 102 U/L
ALT SERPL-CCNC: 32 U/L
ANION GAP SERPL CALC-SCNC: 16 MMOL/L
AST SERPL-CCNC: 24 U/L
BILIRUB SERPL-MCNC: 0.8 MG/DL
BUN SERPL-MCNC: 10 MG/DL
CALCIUM SERPL-MCNC: 10.2 MG/DL
CHLORIDE SERPL-SCNC: 102 MMOL/L
CO2 SERPL-SCNC: 23 MMOL/L
CREAT SERPL-MCNC: 0.51 MG/DL
ESTRADIOL SERPL-MCNC: 29 PG/ML
FSH SERPL-MCNC: 37.3 IU/L
GLUCOSE SERPL-MCNC: 114 MG/DL
POTASSIUM SERPL-SCNC: 3.4 MMOL/L
PROT SERPL-MCNC: 8.1 G/DL
SODIUM SERPL-SCNC: 141 MMOL/L

## 2022-02-10 NOTE — ASSESSMENT
[FreeTextEntry1] : Ms. 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 ER/ME status is positive. She is s/p right breast excisional biopsy on 9/23/21\par \par We reviewed the natural history and treatment options for ductal carcinoma in situ. We discussed the risk of recurrence and risk reduction strategies for DCIS including the role of endocrine therapy which is expected to decrease the risk of invasive breast cancer/DCIS in the ipsilateral and contralateral breast by 50%., although there is no survival benefit associated with it. \par \par I recommend tamoxifen 20 mg daily x 5 years. I discussed the side effects of tamoxifen including but not limited to hot flashes, mood changes, fluid retention, vaginal dryness. cataracts, thromboembolic events, stroke, cardiovascular side effects and the risk of endometrial cancer. \par \par I reviewed that tamoxifen may stop her periods or can cause irregular bleeding.  I reviewed teratogenic effects of tamoxifen. She will continue to use non hormonal  barrier contraception to prevent pregnancy. No OCP use. She will followup with her gynecologist every 6-12 months and report any unusual vaginal bleeding or spotting. I recommended her to see an ophthalmologist annually. Patient is a nonsmoker and has well controlled HTN. She has no personal or family history of coagulation disorders.  She will continue to follow with her primary care physician for evaluation and management of risk factors for stroke. I educated about signs and symptoms of DVT/PE. Patient will report if she develops acute onset chest pain shortness of breath, leg swelling or calf pain. She will start aspirin 81 mg. She understands the thrombotic risk and will hold tamoxifen prior to surgery or prolonged travel.\par \par \par Initial consult with me in October. Pt was recommended to see rad onc. She cx appt, didn’t notify me. She hasn’t taken tamoxifen at all due to her fear about side effects. She is taking pomegranate vitamins. She doesn’t want to get RT. She met with genetics, results pending.\par After a long discussion of risks and benefits, pt agreeable to start tamoxifen. Will f/u in one month to monitor toxicity and compliance\par GYN Dr Levin, ACP, NHP, \par \par \par The patient had plenty of time to ask questions and all of her questions were answered to her satisfaction. I gave her my office phone number and encouraged her to call with any questions or additional information.\par \par

## 2022-02-10 NOTE — REASON FOR VISIT
[Follow-Up Visit] : a follow-up [Other: _____] : [unfilled] [[employer] ] : provided by [employer]   [FreeTextEntry2] : ER/MN + Right breast Ductal carcinoma in situ  [FreeTextEntry3] : ISRAT [id number]

## 2022-02-10 NOTE — PHYSICAL EXAM
[Fully active, able to carry on all pre-disease performance without restriction] : Status 0 - Fully active, able to carry on all pre-disease performance without restriction [Normal] : affect appropriate [de-identified] : healed lumpectomy scar

## 2022-02-10 NOTE — HISTORY OF PRESENT ILLNESS
[T: ___] : T[unfilled] [N: ___] : N[unfilled] [M: ___] : M[unfilled] [AJCC Stage: ____] : AJCC Stage: [unfilled] [de-identified] : Ms.Parvin Hernadez is a 46 year old female here for an evaluation of breast cancer. Her oncologic history is as follows:\par \par She underwent routine breast imaging on 5/26/21 (BIRADS 4) the mammo showed heterogeneously dense breasts, a  right 5:00 N6 multilobulated mass 21 x 24 mm, unchanged and  a right lower outer breast 6 x 8 mm, faint nodule. The left breast was  negative. The US revealed multiple right breast masses. The largest nodule on the right is at 5:00 4 cm FN and there is a  nodule on the right at 11:00 4 cm FN for which biopsies are rec. 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 ER/WA status is positive. She is s/p right breast excisional biopsy on 9/23/21\par Initial consult with me in October. Pt was recommended to see rad onc. She cx appt, didn’t notify me. She hasn’t taken tamoxifen at all due to her fear about side effects. She is taking pomegranate vitamins. She doesn’t want to get RT. She met with genetics, results pending.\par After a long discussion of risks and benefits, pt agreeable to start tamoxifen. Will f/u in one month to monitor toxicity and compliance\par GYN Dr Levin, ACP, NHP, \par

## 2022-02-12 ENCOUNTER — TRANSCRIPTION ENCOUNTER (OUTPATIENT)
Age: 47
End: 2022-02-12

## 2022-02-16 ENCOUNTER — APPOINTMENT (OUTPATIENT)
Dept: ULTRASOUND IMAGING | Facility: CLINIC | Age: 47
End: 2022-02-16
Payer: MEDICAID

## 2022-02-16 ENCOUNTER — RESULT REVIEW (OUTPATIENT)
Age: 47
End: 2022-02-16

## 2022-02-16 ENCOUNTER — OUTPATIENT (OUTPATIENT)
Dept: OUTPATIENT SERVICES | Facility: HOSPITAL | Age: 47
LOS: 1 days | End: 2022-02-16
Payer: MEDICAID

## 2022-02-16 ENCOUNTER — APPOINTMENT (OUTPATIENT)
Dept: MAMMOGRAPHY | Facility: CLINIC | Age: 47
End: 2022-02-16
Payer: MEDICAID

## 2022-02-16 DIAGNOSIS — Z98.891 HISTORY OF UTERINE SCAR FROM PREVIOUS SURGERY: Chronic | ICD-10-CM

## 2022-02-16 DIAGNOSIS — N60.91 UNSPECIFIED BENIGN MAMMARY DYSPLASIA OF RIGHT BREAST: ICD-10-CM

## 2022-02-16 PROCEDURE — 76642 ULTRASOUND BREAST LIMITED: CPT | Mod: 26,RT

## 2022-02-16 PROCEDURE — 76642 ULTRASOUND BREAST LIMITED: CPT

## 2022-03-07 ENCOUNTER — APPOINTMENT (OUTPATIENT)
Dept: RADIATION ONCOLOGY | Facility: CLINIC | Age: 47
End: 2022-03-07
Payer: MEDICAID

## 2022-03-07 VITALS
SYSTOLIC BLOOD PRESSURE: 123 MMHG | TEMPERATURE: 97.7 F | WEIGHT: 125 LBS | RESPIRATION RATE: 16 BRPM | HEART RATE: 85 BPM | BODY MASS INDEX: 24.87 KG/M2 | DIASTOLIC BLOOD PRESSURE: 78 MMHG | OXYGEN SATURATION: 99 %

## 2022-03-07 PROCEDURE — 99204 OFFICE O/P NEW MOD 45 MIN: CPT | Mod: GC,25

## 2022-03-07 NOTE — OB/GYN HISTORY
[___] : Full Term: [unfilled] [Definite:  ___ (Date)] : the last menstrual period was [unfilled] [Experiencing Menopausal Sxs] : experiencing menopausal symptoms [Menopause Age: ____] : patient was [unfilled] years old at menopause

## 2022-03-09 ENCOUNTER — APPOINTMENT (OUTPATIENT)
Dept: SURGICAL ONCOLOGY | Facility: CLINIC | Age: 47
End: 2022-03-09
Payer: MEDICAID

## 2022-03-11 NOTE — DISCUSSION/SUMMARY
[FreeTextEntry1] : REASON FOR CONSULT\par Zoraida Hernadez is a 47-year-old female who was called 02/10/2022 for a discussion regarding her APC and POLD1 variants of uncertain significance (VUS) genetic testing results related to hereditary cancer predisposition. \par \par Ms. Hernadez was originally seen at [facility] on 01/25/2022 for hereditary cancer predisposition risk assessment. She was diagnosed with right breast cancer (DCIS, ER+/DE+) at age 46. Ms. Hernadez decided to pursue genetic testing using a breast cancer panel and guidelines-based colorectal cancer panel (29 genes) offered by [employer]. \par \par TEST RESULTS: \par APC VARIANT OF UNCERTAIN SIGNIFICANCE (VUS) (c.3946G>C; p.[id number])\par POLD1 VARIANT OF UNCERTAIN SIGNIFICANCE (VUS) (c.49C>T; p.[id number])\par \par NO pathogenic (disease-causing) variants or additional VUSs were detected in the following genes: APC*, BENJAMIN*, AXIN2, BARD1, BMPR1A, BRCA1, BRCA2, BRIP1, CDH1, CHEK2, EPCAM*, GREM1*, MLH1*, MSH2*, MSH3*, MSH6*, MUTYH, NF1*, NTHL1, PALB2, PMS2*, POLD1*, POLE, PTEN*, RAD51C, RAD51D, SMAD4, STK11, TP53\par \par RESULTS INTERPRETATION AND ASSESSMENT:\par At this time the available evidence is insufficient to determine the role of this VUS in disease and the clinical significance of this result is uncertain. Individuals with a pathogenic mutation in APC may have an increased risk for cancers and features associated with Familial Adenomatous Polyposis (FAP) or attenuated-FAP. Individuals with a pathogenic mutation in POLD1 may have an increased risk for colon polyps and colon cancer. It is unknown if the patient has an increased risk for the cancers associated with APC and/or POLD1 at this time. \par \par The detection of this VUS does NOT currently change the patient’s medical management. It is NOT recommended at this time that family members use this result for predictive genetic testing or medical management decisions. With more research, a VUS may be reclassified as either disease-causing or benign. Ms. Hernadez was encouraged to contact us every 2-3 years to enquire about any new information for this variant, or sooner if there are any changes in her personal or family history of cancer.  Such updates could possibly change our risk assessment and recommendations.\par \par We discussed that the cause of the Ms. Hernadez’s cancer and family history of cancer remains unknown and that this result does not rule out a hereditary cancer risk in the patient since it is possible, although unlikely, the patient has a mutation that is not detectable by this analysis or is in an unidentified gene. It is also possible there is a hereditary cancer predisposition in the family, but the patient did not inherit it. \par \par Given Ms. Hernadez’s personal and current reported family history of cancer, and her negative genetic test results, the following screening guidelines and risk-reducing recommendations were discussed:\par \par BREAST: \par - Long-term management and surveillance should be based on Ms. Hernadez’s on- or post-treatment protocol as recommended by her oncologist \par \par COLON:\par - As per the U.[place] Multi-Society Task Force on Colorectal Cancer, we discussed individuals with a first-degree relative with colorectal cancer over the age of 60 should start screening via colonoscopies at age 40 and repeat as per the average-risk screening recommendations (or sooner if clinically indicated). Ms. Hernadez has never had a colonoscopy; she was encouraged to see a Gastroenterologist. \par \par OTHER: \par - In the absence of other indications, Ms. Hernadez should practice age-appropriate cancer screening of other organ systems as recommended for the general population.\par \par PLAN:\par 1.	These results do not change Ms. Hernadez’s medical management. Long-term management and surveillance should be based on the patient’s on- or post-treatment protocol as recommended by her oncologist. \par 2.	Testing of family members based on this result is not indicated. \par 3.	The patient was encouraged to contact us every 2-3 years to check on any changes in interpretation of the VUS, or sooner if there are changes in her personal or family history of cancer.\par 4.	Based on her family history of colon cancer, the patient may consider increased screening via colonoscopies (see discussion above) at the discretion of their care team.\par 5.	Patient informed consult note(s) will be available through their [facility] patient portal and genetic test results will be released via [employer]’s laboratory portal. \par \par \par For any additional questions please call [facility] at [phone]. \par \par \par Frederick Goncalves MS, [facility]\par Genetic Counselor, [facility]\par \par \par CC: \par Patient\par Dr. Maria Del Carmen Zhu

## 2022-03-13 NOTE — PHYSICAL EXAM
[Sclera] : the sclera and conjunctiva were normal [] : no respiratory distress [Outer Ear] : the ears and nose were normal in appearance [Heart Rate And Rhythm] : heart rate and rhythm were normal [Normal] : no palpable adenopathy [Supraclavicular Lymph Nodes Enlarged Bilaterally] : supraclavicular [Axillary Lymph Nodes Enlarged Bilaterally] : axillary [Musculoskeletal - Swelling] : no joint swelling [No Focal Deficits] : no focal deficits [Oriented To Time, Place, And Person] : oriented to person, place, and time [Breast Abnormal Lactation (Galactorrhea)] : no nipple discharge [Abdomen Soft] : soft [Nondistended] : nondistended [de-identified] : Right lumpectomy scar is well healed, no erythema [de-identified] : scattered mildly hyperpigmented spots on extremities; no erythema, papules or vesicles

## 2022-03-13 NOTE — HISTORY OF PRESENT ILLNESS
[FreeTextEntry1] : Ms. Hernadez is a 48 yo premenopausal female with low grade DCIS, ER+ 90%/ UT+70% s/p excisional biopsy on 9/2021 with negative margins. Patient presents to discuss radiation therapy after previously canceled appointments. \par \par Brief Oncological History:\par She underwent routine breast imaging on 5/26/21 (BIRADS 4) with mammography showing heterogeneously dense breasts, a right 5:00 N6 multilobulated mass 21 x 24 mm, unchanged and a right lower outer breast 6 x 8 mm, faint nodule. The left breast was negative. The US revealed multiple right breast masses. The largest nodule on the right is at 5:00 4 cm FN and there was a nodule on the right at 11:00 4 cm FN for which biopsies are rec. The other nodules, (R 12:00 N5 3 x 5 x 2 mm cyst, R 8:00 N5 8 x 5 x 8 mm hypoechoic mass, complicated cyst vs FA and L 8:00 N7 3 mm cyst), 6 month follow up was advised. 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 pTis.\par \par Patient cancelled 10/19/21 appointment and delayed starting Tamoxifen due to concerns about side effects. She started Tamoxifen on 2/9/22.   Her LMP she reports was in 4/2021. She started having itching of her bilateral thighs with few scattered bumps. She reports the symptoms started after starting tamoxifen, but she has had a similar rash on other parts of the body prior. She is without respiratory symptoms.

## 2022-03-16 ENCOUNTER — OUTPATIENT (OUTPATIENT)
Dept: OUTPATIENT SERVICES | Facility: HOSPITAL | Age: 47
LOS: 1 days | Discharge: HOME | End: 2022-03-16

## 2022-03-16 DIAGNOSIS — Z98.891 HISTORY OF UTERINE SCAR FROM PREVIOUS SURGERY: Chronic | ICD-10-CM

## 2022-03-16 DIAGNOSIS — D64.9 ANEMIA, UNSPECIFIED: ICD-10-CM

## 2022-03-25 ENCOUNTER — NON-APPOINTMENT (OUTPATIENT)
Age: 47
End: 2022-03-25

## 2022-03-29 ENCOUNTER — NON-APPOINTMENT (OUTPATIENT)
Age: 47
End: 2022-03-29

## 2022-03-30 ENCOUNTER — RESULT REVIEW (OUTPATIENT)
Age: 47
End: 2022-03-30

## 2022-03-30 ENCOUNTER — APPOINTMENT (OUTPATIENT)
Dept: SURGICAL ONCOLOGY | Facility: CLINIC | Age: 47
End: 2022-03-30
Payer: MEDICAID

## 2022-03-30 VITALS
SYSTOLIC BLOOD PRESSURE: 135 MMHG | HEIGHT: 59 IN | RESPIRATION RATE: 16 BRPM | WEIGHT: 120 LBS | BODY MASS INDEX: 24.19 KG/M2 | TEMPERATURE: 209.3 F | HEART RATE: 72 BPM | OXYGEN SATURATION: 98 % | DIASTOLIC BLOOD PRESSURE: 87 MMHG

## 2022-03-30 DIAGNOSIS — Z86.000 PERSONAL HISTORY OF IN-SITU NEOPLASM OF BREAST: ICD-10-CM

## 2022-03-30 DIAGNOSIS — R92.2 INCONCLUSIVE MAMMOGRAM: ICD-10-CM

## 2022-03-30 PROCEDURE — 99213 OFFICE O/P EST LOW 20 MIN: CPT

## 2022-03-30 NOTE — ASSESSMENT
[FreeTextEntry1] : The patient is a 46 year old female with R breast ADH s/p excisional biopsy with Magseed localization 9/23/2021 which revealed DCIS, low grade, ER 90%, AR 70%, 4 mm, negative margins. pTisNx. Stage 0. DCISionRT sent, minimal benefit to adjuvant RT.\par \par \par Plan:\par  - f/u med onc Dr. Zhu, continue tamoxifen\par  - 5/2022 B/L SM and US\par  - Would recommend screening MRI, spaced 6 months after screening mmg, 11/2022\par  - RTO after MRI\par

## 2022-03-30 NOTE — PHYSICAL EXAM
[Normocephalic] : normocephalic [Atraumatic] : atraumatic [EOMI] : extra ocular movement intact [PERRL] : pupils equal, round and reactive to light [Sclera nonicteric] : sclera nonicteric [Supple] : supple [No Supraclavicular Adenopathy] : no supraclavicular adenopathy [No Cervical Adenopathy] : no cervical adenopathy [Examined in the supine and seated position] : examined in the supine and seated position [Symmetrical] : symmetrical [Bra Size: ___] : Bra Size: [unfilled] [Grade 1] : Ptosis Grade 1 [No dominant masses] : no dominant masses in right breast  [No dominant masses] : no dominant masses left breast [Breast Nipple Inversion] : nipples not inverted [Breast Nipple Retraction] : nipples not retracted [Breast Nipple Flattening] : nipples not flattened [Breast Nipple Fissures] : nipples not fissured [Breast Abnormal Lactation (Galactorrhea)] : no galactorrhea [Breast Abnormal Secretion Bloody Fluid] : no bloody discharge [Breast Abnormal Secretion Serous Fluid] : no serous discharge [Breast Abnormal Secretion Opalescent Fluid] : no milky discharge [No Axillary Lymphadenopathy] : no left axillary lymphadenopathy [de-identified] : non-labored respirations  [de-identified] : inferior circumareolar incision well-healed. no seroma, no fluctuance. No erythema

## 2022-03-30 NOTE — HISTORY OF PRESENT ILLNESS
[FreeTextEntry1] : The patient is a 46 year old female with Right breast ADH s/p R excisional biopsy with MagSeed Localization 9/23/2021 here for follow-up.\par \par Prior history:\par The patient reports that she's noticed a right breast mass for approximately 20 years that has not changed. She underwent recent screening imaging without any breast complaints.\par \par Most recent imaging:\par 5/26/2021 B/L SM ([facility]) revealed heterogeneously dense breasts \par  - R 5:00 N6 multilobulated mass 21 x 24 mm, unchanged\par  - R lower outer breast 6 x 8 mm, faint nodule\par  - L negative\par \par 5/26/2021 B/L US\par  - R 12:00 N5 3 x 5 x 2 mm cyst -> 6 mo f/u\par  - R 5:00 N4 2.4 x 1.1 x 2.1 cm hypoechoic mass ->bx\par  - R 8:00 N5 8 x 5 x 8 mm hypoechoic mass, complicated cyst vs FA -> 6 mo F/u\par  - R 11:00 N4 6 x 5 x 6 mm hypoechoic nodule -> bx\par  - L 8:00 N7 3 mm cyst\par  - BR4\par \par 7/12/2021 R USG-CNB ([facility])\par  - R 5:00 N4 (R clip) - ADH\par  - R 11:00 N4 (S clip) - FA\par \par 9/23/2021 R breast excisional biopsy with Magseed localization: \par  - Ductal carcinoma in situ arising in a background of fibroadenoma, cribriform type with low nuclear grade, 4 mm\par - Negative resection margins\par - Microcalcifications\par - Reparative changes consistent with prior biopsy site is seen\par - ER 90% strong, HI 70% mod to weak\par  - pTisNx, AJCC Stage 0\par \par \par Postop: The patient reports that the day after surgery, she noticed bleeding from the surgical site underneath her dressing. She was too nervous to take off the dressing, but held pressure. She was unable to contact anyone at the office (tried to call, but did not get through, did not speak to emergency line). The bleeding did not continue so the patient kept her postop appointment. Denies fevers, chills. Was taking Tylenol once a day for pain, but no longer needing that.  The patient also notes a rash over the area of surgery-itchy.\par \par 2/16/2022 R US\par  - R 8:00 N5 stable 8 x 4 x 8 mm circumscribed oval hypoechoic mass, unchanged since May 2021 -> 1 yr US\par  - R 12:00 and 1:00 N5 benign-appearing cysts\par  - R 11:00 N4 biopsy proven benign fibroadenoma, stable\par \par Interval history:\par Pt cancelled MRI and rad onc. Finally rescheduled with Dr. Arora and decided on obtaining DCISionRT to decide benefit of adjuvant RT. DCISionRT score was 0.8, denoting virtually no benefit to RT and pt and Dr. Arora agreed to omit. \par \par Patient saw med onc in October but did not take tamoxifen due to fear of side effects. Now she has been taking it for approx 1 month, tolerating well.\par \par Pt also saw [facility]--[employer] panel sent (29 genes)- APC and POLD1 VUS identified.\par \par Today reports some breast tenderness in the area of surgery when pressing down hard, otherwise no complaints.

## 2022-03-30 NOTE — CONSULT LETTER
[FreeTextEntry3] : Balbina Forrester MD\par Breast Surgeon\par Division of Surgical Oncology\par Department of Surgery\par [address] [place] [address]\[place] [address] \par Tel: [phone]\par Fax: [phone]\par Email: [email or web address]

## 2022-04-04 ENCOUNTER — RESULT REVIEW (OUTPATIENT)
Age: 47
End: 2022-04-04

## 2022-04-04 ENCOUNTER — APPOINTMENT (OUTPATIENT)
Dept: HEMATOLOGY ONCOLOGY | Facility: CLINIC | Age: 47
End: 2022-04-04
Payer: MEDICAID

## 2022-04-04 VITALS
SYSTOLIC BLOOD PRESSURE: 107 MMHG | HEART RATE: 73 BPM | DIASTOLIC BLOOD PRESSURE: 72 MMHG | WEIGHT: 119.71 LBS | HEIGHT: 59.06 IN | BODY MASS INDEX: 24.13 KG/M2 | TEMPERATURE: 207.9 F | OXYGEN SATURATION: 99 % | RESPIRATION RATE: 16 BRPM

## 2022-04-04 LAB
BASOPHILS # BLD AUTO: 0.05 K/UL — SIGNIFICANT CHANGE UP (ref 0–0.2)
BASOPHILS NFR BLD AUTO: 0.8 % — SIGNIFICANT CHANGE UP (ref 0–2)
EOSINOPHIL # BLD AUTO: 0.15 K/UL — SIGNIFICANT CHANGE UP (ref 0–0.5)
EOSINOPHIL NFR BLD AUTO: 2.5 % — SIGNIFICANT CHANGE UP (ref 0–6)
HCT VFR BLD CALC: 38.6 % — SIGNIFICANT CHANGE UP (ref 34.5–45)
HGB BLD-MCNC: 12.8 G/DL — SIGNIFICANT CHANGE UP (ref 11.5–15.5)
IMM GRANULOCYTES NFR BLD AUTO: 0.2 % — SIGNIFICANT CHANGE UP (ref 0–1.5)
LYMPHOCYTES # BLD AUTO: 2.02 K/UL — SIGNIFICANT CHANGE UP (ref 1–3.3)
LYMPHOCYTES # BLD AUTO: 33.3 % — SIGNIFICANT CHANGE UP (ref 13–44)
MCHC RBC-ENTMCNC: 29 PG — SIGNIFICANT CHANGE UP (ref 27–34)
MCHC RBC-ENTMCNC: 33.2 G/DL — SIGNIFICANT CHANGE UP (ref 32–36)
MCV RBC AUTO: 87.5 FL — SIGNIFICANT CHANGE UP (ref 80–100)
MONOCYTES # BLD AUTO: 0.53 K/UL — SIGNIFICANT CHANGE UP (ref 0–0.9)
MONOCYTES NFR BLD AUTO: 8.7 % — SIGNIFICANT CHANGE UP (ref 2–14)
NEUTROPHILS # BLD AUTO: 3.31 K/UL — SIGNIFICANT CHANGE UP (ref 1.8–7.4)
NEUTROPHILS NFR BLD AUTO: 54.5 % — SIGNIFICANT CHANGE UP (ref 43–77)
NRBC # BLD: 0 /100 WBCS — SIGNIFICANT CHANGE UP (ref 0–0)
PLATELET # BLD AUTO: 276 K/UL — SIGNIFICANT CHANGE UP (ref 150–400)
RBC # BLD: 4.41 M/UL — SIGNIFICANT CHANGE UP (ref 3.8–5.2)
RBC # FLD: 13.2 % — SIGNIFICANT CHANGE UP (ref 10.3–14.5)
WBC # BLD: 6.07 K/UL — SIGNIFICANT CHANGE UP (ref 3.8–10.5)
WBC # FLD AUTO: 6.07 K/UL — SIGNIFICANT CHANGE UP (ref 3.8–10.5)

## 2022-04-04 PROCEDURE — 99214 OFFICE O/P EST MOD 30 MIN: CPT

## 2022-04-05 LAB
ALBUMIN SERPL ELPH-MCNC: 4.6 G/DL
ALP BLD-CCNC: 80 U/L
ALT SERPL-CCNC: 51 U/L
ANION GAP SERPL CALC-SCNC: 11 MMOL/L
AST SERPL-CCNC: 32 U/L
BILIRUB SERPL-MCNC: 0.4 MG/DL
BUN SERPL-MCNC: 9 MG/DL
CALCIUM SERPL-MCNC: 9.6 MG/DL
CHLORIDE SERPL-SCNC: 107 MMOL/L
CO2 SERPL-SCNC: 24 MMOL/L
CREAT SERPL-MCNC: 0.51 MG/DL
EGFR: 115 ML/MIN/1.73M2
ESTRADIOL SERPL-MCNC: <5 PG/ML
FSH SERPL-MCNC: 27.1 IU/L
GLUCOSE SERPL-MCNC: 85 MG/DL
POTASSIUM SERPL-SCNC: 4 MMOL/L
PROT SERPL-MCNC: 7.8 G/DL
SODIUM SERPL-SCNC: 142 MMOL/L

## 2022-04-13 NOTE — REASON FOR VISIT
[Follow-Up Visit] : a follow-up [Other: _____] : [unfilled] [[employer] ] : provided by [employer]   [FreeTextEntry2] : ER/IN + Right breast Ductal carcinoma in situ  [FreeTextEntry3] : ISRAT [id number]

## 2022-04-13 NOTE — PHYSICAL EXAM
[Normal] : affect appropriate [Fully active, able to carry on all pre-disease performance without restriction] : Status 0 - Fully active, able to carry on all pre-disease performance without restriction [de-identified] : right perireolar healed lumpectomy scar

## 2022-04-13 NOTE — HISTORY OF PRESENT ILLNESS
[T: ___] : T[unfilled] [N: ___] : N[unfilled] [M: ___] : M[unfilled] [AJCC Stage: ____] : AJCC Stage: [unfilled] [de-identified] : Ms.Parvin Hernadez is a 46 year old female here for an evaluation of breast cancer. Her oncologic history is as follows:\par \par She underwent routine breast imaging on 5/26/21 (BIRADS 4) the mammo showed heterogeneously dense breasts, a  right 5:00 N6 multilobulated mass 21 x 24 mm, unchanged and  a right lower outer breast 6 x 8 mm, faint nodule. The left breast was  negative. The US revealed multiple right breast masses. The largest nodule on the right is at 5:00 4 cm FN and there is a  nodule on the right at 11:00 4 cm FN for which biopsies are rec. The other nodules, ( R 12:00 N5 3 x 5 x 2 mm cyst, R 8:00 N5 8 x 5 x 8 mm hypoechoic mass, complicated cyst vs FA and L 8:00 N7 3 mm cyst) can be followed at 6 month intervals.\par \par  She underwent right breast 5:00 4cm FN and right breast 11:00 4 cm FN core biopsies on 7/12/21: the right breast 5:00 biopsy showed  atypical ductal hyperplasia involving a fibroadenoma, the right breast 11:00 biopsy showed a fibroadenoma.\par \par She underwent right breast excisional biopsy on 9/23/21 which revealed low nuclear grade (DCIS) ductal carcinoma in situ,cribriform type, ER+ 90%, FL+ 70% measuring 0.4 cm, Negative resection margins, Microcalcifications present\par  \par \par LMP 7/2021\par periods are irregular for a year, no hot flashes, condom use for birth control\par 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 ER/NV status is positive. She is s/p right breast excisional biopsy on 9/23/21\par Initial consult with me in October. Pt was recommended to see rad onc. She cx appt, didn’t notify me. She hasn’t taken tamoxifen at all due to her fear about side effects. She is taking pomegranate vitamins. She doesn’t want to get RT. She met with genetics, results pending.\par After a long discussion of risks and benefits, pt agreeable to start tamoxifen. Will f/u in one month to monitor toxicity and compliance\par GYN Dr Levin, ACP, NHP, \par Started tamoxifen 2/9/22 Ms. LASHON KHAN is here for a follow up visit today for breast ca, on Tamoxifen\par She is tolerating tamoxifen with tolerable hot flashes.reports skin itching to thighs x 2 weeks relieved with hydrocortisone, resolved now. Mild back pain not new  Good compliance. She denies mood changes, wt gain, vaginal dryness, SOB, chest pain, leg swelling or clotting issues. Her energy and appetite is good, wt stable. Has 3 children 19 yo girl, 17 yo boy, 7 yo boy speech delayed works.\par  Exercises: does 20 minutes daily on treadmill.\par LMP date:4/2021 , no intermittent spotting, no vaginal discharge. Using contraception \par GYN 1/2022 pelvic sono WNL\par Breast imaging: Due 5/2022\par Opthal:reminded to schedule\par She is concerned about side effects from tamoxifen but is willing to continue =for now. We will check FSH and estradiol today. Discussed switching to AI after menopause.\par \par

## 2022-04-13 NOTE — ASSESSMENT
[FreeTextEntry1] : Ms. 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 ER/UT status is positive. She is s/p right breast excisional biopsy on 9/23/21\par \par We reviewed the natural history and treatment options for ductal carcinoma in situ. We discussed the risk of recurrence and risk reduction strategies for DCIS including the role of endocrine therapy which is expected to decrease the risk of invasive breast cancer/DCIS in the ipsilateral and contralateral breast by 50%., although there is no survival benefit associated with it. \par \par Initial consult with me in October. Pt was recommended to see rad onc. She cx appt, didn’t notify me. She hasn’t taken tamoxifen at all due to her fear about side effects. She is taking pomegranate vitamins. She doesn’t want to get RT. She met with genetics 1/25/22 [employer] BRCA negative  \par I recommend tamoxifen 20 mg daily x 5 years. I discussed the side effects of tamoxifen including but not limited to hot flashes, mood changes, fluid retention, vaginal dryness. cataracts, thromboembolic events, stroke, cardiovascular side effects and the risk of endometrial cancer. She has no personal or family history of coagulation disorders.  .. \par After a long discussion of risks and benefits, pt agreeable to start tamoxifen. \par \par - Breast ca: Started tamoxifen 2/9/22. She is tolerating tamoxifen with expected side effects. Reports good compliance. Plan to continue tamoxifen for  5 years. She is concerned about side effects from tamoxifen but is willing to continue for now. We will check FSH and estradiol today. Will switch to AI after menopause.\par GYN  Dr Levin, ACP, NHP,   and opthal f/u annually due to risk of endometrial ca and cataracts respectively.Patient is a nonsmoker and has well controlled HTN  She will continue to follow with her primary care physician for evaluation and management of risk factors for stroke.   Annual Breast imaging with Dr. Balbina Forrester due 5/2022\par -  Mild hot flashes: 2/2 tamoxifen.  Advised to wear layers and use fan prn. Consider Effexor if get worse.\par - Wt gain:Life style modifications, healthy diet and exercise d/w her\par - Irregular periods: 2/2 perimenopause. Chemically not in menopause. Annual GYN f/u. She will continue to use non-hormonal contraception\par - She will continue to use non hormonal  barrier contraception to prevent pregnancy. No OCP use\par - Patient is aware of signs and symptoms of DVT/PE. Patient will report if she develops acute onset chest pain shortness of breath, leg swelling or calf pain. She will report if she develops acute onset chest pain shortness of breath, leg swelling or calf pain. She will start aspirin 81 mg. She understands the thrombotic risk and will hold tamoxifen prior to surgery or prolonged travel\par \par The patient had plenty of time to ask questions and all of her questions were answered to her satisfaction. I gave her my office phone number and encouraged her to call with any questions or additional information.\par RTC 3 m. \par

## 2022-05-16 NOTE — ASSESSMENT
[FreeTextEntry1] : Ms. 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 ER/SC status is positive. She is s/p right breast excisional biopsy on 9/23/21\par \par We reviewed the natural history and treatment options for ductal carcinoma in situ. We discussed the risk of recurrence and risk reduction strategies for DCIS including the role of endocrine therapy which is expected to decrease the risk of invasive breast cancer/DCIS in the ipsilateral and contralateral breast by 50%., although there is no survival benefit associated with it. \par \par I recommend tamoxifen 20 mg daily x 5 years. I discussed the side effects of tamoxifen including but not limited to hot flashes, mood changes, fluid retention, vaginal dryness. cataracts, thromboembolic events, stroke, cardiovascular side effects and the risk of endometrial cancer. \par \par I reviewed that tamoxifen may stop her periods or can cause irregular bleeding.  I reviewed teratogenic effects of tamoxifen. She will continue to use non hormonal  barrier contraception to prevent pregnancy. No OCP use. She will followup with her gynecologist every 6-12 months and report any unusual vaginal bleeding or spotting. I recommended her to see an ophthalmologist annually. Patient is a nonsmoker and has well controlled HTN. She has no personal or family history of coagulation disorders.  She will continue to follow with her primary care physician for evaluation and management of risk factors for stroke. I educated about signs and symptoms of DVT/PE. Patient will report if she develops acute onset chest pain shortness of breath, leg swelling or calf pain. She will start aspirin 81 mg. She understands the thrombotic risk and will hold tamoxifen prior to surgery or prolonged travel.\par \par \par After understanding the risks and benefits of tamoxifen, patient has decided to start tamoxifen. She is seeing rad onc next week. She will start tamoxifen 2 weeks after RT.  She will continue annual mammogram. I will see her back in 3 months or sooner if she develops any side effects. \par \par The patient had plenty of time to ask questions and all of her questions were answered to her satisfaction. I gave her my office phone number and encouraged her to call with any questions or additional information.\par \par Email sent for genetics, GYn and opthal appts\par Emailed navigators re RT appt Male

## 2022-07-05 ENCOUNTER — OUTPATIENT (OUTPATIENT)
Dept: OUTPATIENT SERVICES | Facility: HOSPITAL | Age: 47
LOS: 1 days | Discharge: HOME | End: 2022-07-05

## 2022-07-05 DIAGNOSIS — D64.9 ANEMIA, UNSPECIFIED: ICD-10-CM

## 2022-07-05 DIAGNOSIS — Z98.891 HISTORY OF UTERINE SCAR FROM PREVIOUS SURGERY: Chronic | ICD-10-CM

## 2022-07-18 ENCOUNTER — APPOINTMENT (OUTPATIENT)
Dept: HEMATOLOGY ONCOLOGY | Facility: CLINIC | Age: 47
End: 2022-07-18

## 2022-07-18 VITALS
HEIGHT: 58.27 IN | OXYGEN SATURATION: 99 % | DIASTOLIC BLOOD PRESSURE: 75 MMHG | HEART RATE: 90 BPM | TEMPERATURE: 97.2 F | RESPIRATION RATE: 16 BRPM | BODY MASS INDEX: 23.97 KG/M2 | WEIGHT: 115.74 LBS | SYSTOLIC BLOOD PRESSURE: 113 MMHG

## 2022-07-18 PROCEDURE — 99214 OFFICE O/P EST MOD 30 MIN: CPT

## 2022-07-18 NOTE — ASSESSMENT
[FreeTextEntry1] : Ms. 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 ER/OR status is positive. She is s/p right breast excisional biopsy on 9/23/21. Initial consult with me in October 2021. Pt was recommended to see rad onc. She cx appt with rad onc, didn’t notify me. She hasn’t taken tamoxifen at all due to her fear about side effects. She is taking pomegranate vitamins. She doesn’t want to get RT. After a long discussion of risks and benefits, pt agreeable to start tamoxifen. Started tamoxifen 2/9/22 \par \par - Breast ca: Started tamoxifen 2/9/22. She is tolerating tamoxifen with expected side effects. Reports good compliance. Plan to continue tamoxifen for  5 years. \par GYN  Dr Levin, ACP, NHP,   and opthal f/u annually due to risk of endometrial ca and cataracts respectively.\par Annual Breast imaging with Dr. Balbina Forrester due 7/2022\par - HTN: Well controlled. continue amlodipine and ASA \par -  Mild hot flashes: 2/2 tamoxifen.  Advised to wear layers and use fan prn. Consider Effexor if get worse.\par - Wt gain:Life style modifications, healthy diet and exercise d/w her\par - Irregular periods: 2/2 perimenopause. Chemically not in menopause. Annual GYN f/u. She will continue to use non-hormonal contraception\par - Patient is aware of signs and symptoms of DVT/PE. Patient will report if she develops acute onset chest pain shortness of breath, leg swelling or calf pain. She will report if she develops acute onset chest pain shortness of breath, leg swelling or calf pain. \par RTC6 m. \par

## 2022-07-18 NOTE — HISTORY OF PRESENT ILLNESS
[T: ___] : T[unfilled] [N: ___] : N[unfilled] [M: ___] : M[unfilled] [AJCC Stage: ____] : AJCC Stage: [unfilled] [de-identified] : Ms.Parvin Hernadez is a 46 year old female here for an evaluation of breast cancer. Her oncologic history is as follows:\par \par She underwent routine breast imaging on 5/26/21 (BIRADS 4) the mammo showed heterogeneously dense breasts, a  right 5:00 N6 multilobulated mass 21 x 24 mm, unchanged and  a right lower outer breast 6 x 8 mm, faint nodule. The left breast was  negative. The US revealed multiple right breast masses. The largest nodule on the right is at 5:00 4 cm FN and there is a  nodule on the right at 11:00 4 cm FN for which biopsies are rec. 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 ER/MI status is positive. She is s/p right breast excisional biopsy on 9/23/21. Initial consult with me in October. Pt was recommended to see rad onc. She cx appt with rad onc, didn’t notify me. She hasn’t taken tamoxifen at all due to her fear about side effects. She is taking pomegranate vitamins. She doesn’t want to get RT. After a long discussion of risks and benefits, pt agreeable to start tamoxifen. Started tamoxifen 2/9/22 \par \par She is tolerating tamoxifen with tolerable hot flashes. Good compliance. She denies mood changes, wt gain, vaginal dryness, SOB, chest pain, leg swelling or clotting issues. Her energy and appetite is good, wt stable.  Exercises: does 20 minutes daily on treadmill. Cutting down carbs. Has 3 children 19 yo girl, 17 yo boy, 7 yo boy \par LMP date: 5/2022, Using contraception - condoms\par GYN 1/2022 pelvic sono WNL:  Dr Levin, [facility], [facility], \par Breast imaging: scheduled for next week \par Opthal:reminded to schedule\par

## 2022-07-18 NOTE — PHYSICAL EXAM
[Fully active, able to carry on all pre-disease performance without restriction] : Status 0 - Fully active, able to carry on all pre-disease performance without restriction [Normal] : affect appropriate [de-identified] : right periareolar healed lumpectomy scar

## 2022-07-18 NOTE — REASON FOR VISIT
[Follow-Up Visit] : a follow-up [Other: _____] : [unfilled] [FreeTextEntry2] : ER/[place] + Right breast Ductal carcinoma in situ

## 2022-07-28 ENCOUNTER — APPOINTMENT (OUTPATIENT)
Dept: MAMMOGRAPHY | Facility: IMAGING CENTER | Age: 47
End: 2022-07-28

## 2022-07-28 ENCOUNTER — RESULT REVIEW (OUTPATIENT)
Age: 47
End: 2022-07-28

## 2022-07-28 ENCOUNTER — OUTPATIENT (OUTPATIENT)
Dept: OUTPATIENT SERVICES | Facility: HOSPITAL | Age: 47
LOS: 1 days | End: 2022-07-28
Payer: MEDICAID

## 2022-07-28 ENCOUNTER — APPOINTMENT (OUTPATIENT)
Dept: ULTRASOUND IMAGING | Facility: IMAGING CENTER | Age: 47
End: 2022-07-28

## 2022-07-28 DIAGNOSIS — Z00.00 ENCOUNTER FOR GENERAL ADULT MEDICAL EXAMINATION WITHOUT ABNORMAL FINDINGS: ICD-10-CM

## 2022-07-28 DIAGNOSIS — Z00.8 ENCOUNTER FOR OTHER GENERAL EXAMINATION: ICD-10-CM

## 2022-07-28 DIAGNOSIS — Z98.891 HISTORY OF UTERINE SCAR FROM PREVIOUS SURGERY: Chronic | ICD-10-CM

## 2022-07-28 DIAGNOSIS — D05.11 INTRADUCTAL CARCINOMA IN SITU OF RIGHT BREAST: ICD-10-CM

## 2022-07-28 PROCEDURE — G0279: CPT

## 2022-07-28 PROCEDURE — 76641 ULTRASOUND BREAST COMPLETE: CPT | Mod: 26,50

## 2022-07-28 PROCEDURE — 77066 DX MAMMO INCL CAD BI: CPT | Mod: 26

## 2022-07-28 PROCEDURE — G0279: CPT | Mod: 26

## 2022-07-28 PROCEDURE — 76641 ULTRASOUND BREAST COMPLETE: CPT

## 2022-07-28 PROCEDURE — 77066 DX MAMMO INCL CAD BI: CPT

## 2022-07-29 DIAGNOSIS — R92.1 MAMMOGRAPHIC CALCIFICATION FOUND ON DIAGNOSTIC IMAGING OF BREAST: ICD-10-CM

## 2022-08-04 ENCOUNTER — OUTPATIENT (OUTPATIENT)
Dept: OUTPATIENT SERVICES | Facility: HOSPITAL | Age: 47
LOS: 1 days | End: 2022-08-04
Payer: MEDICAID

## 2022-08-04 ENCOUNTER — APPOINTMENT (OUTPATIENT)
Dept: MAMMOGRAPHY | Facility: IMAGING CENTER | Age: 47
End: 2022-08-04

## 2022-08-04 ENCOUNTER — RESULT REVIEW (OUTPATIENT)
Age: 47
End: 2022-08-04

## 2022-08-04 DIAGNOSIS — Z00.8 ENCOUNTER FOR OTHER GENERAL EXAMINATION: ICD-10-CM

## 2022-08-04 DIAGNOSIS — Z98.891 HISTORY OF UTERINE SCAR FROM PREVIOUS SURGERY: Chronic | ICD-10-CM

## 2022-08-04 DIAGNOSIS — R92.1 MAMMOGRAPHIC CALCIFICATION FOUND ON DIAGNOSTIC IMAGING OF BREAST: ICD-10-CM

## 2022-08-04 PROCEDURE — A4648: CPT

## 2022-08-04 PROCEDURE — 19081 BX BREAST 1ST LESION STRTCTC: CPT | Mod: LT

## 2022-08-04 PROCEDURE — 77065 DX MAMMO INCL CAD UNI: CPT

## 2022-08-04 PROCEDURE — 19081 BX BREAST 1ST LESION STRTCTC: CPT

## 2022-08-04 PROCEDURE — 88305 TISSUE EXAM BY PATHOLOGIST: CPT

## 2022-08-04 PROCEDURE — 88305 TISSUE EXAM BY PATHOLOGIST: CPT | Mod: 26

## 2022-08-04 PROCEDURE — 77065 DX MAMMO INCL CAD UNI: CPT | Mod: 26,LT

## 2022-08-09 LAB — SURGICAL PATHOLOGY STUDY: SIGNIFICANT CHANGE UP

## 2022-11-16 ENCOUNTER — APPOINTMENT (OUTPATIENT)
Dept: SURGICAL ONCOLOGY | Facility: CLINIC | Age: 47
End: 2022-11-16

## 2023-01-17 ENCOUNTER — OUTPATIENT (OUTPATIENT)
Dept: OUTPATIENT SERVICES | Facility: HOSPITAL | Age: 48
LOS: 1 days | Discharge: HOME | End: 2023-01-17

## 2023-01-17 DIAGNOSIS — D64.9 ANEMIA, UNSPECIFIED: ICD-10-CM

## 2023-01-17 DIAGNOSIS — Z98.891 HISTORY OF UTERINE SCAR FROM PREVIOUS SURGERY: Chronic | ICD-10-CM

## 2023-01-23 ENCOUNTER — RESULT REVIEW (OUTPATIENT)
Age: 48
End: 2023-01-23

## 2023-01-23 ENCOUNTER — APPOINTMENT (OUTPATIENT)
Dept: HEMATOLOGY ONCOLOGY | Facility: CLINIC | Age: 48
End: 2023-01-23
Payer: MEDICAID

## 2023-01-23 ENCOUNTER — NON-APPOINTMENT (OUTPATIENT)
Age: 48
End: 2023-01-23

## 2023-01-23 VITALS
HEART RATE: 86 BPM | RESPIRATION RATE: 15 BRPM | SYSTOLIC BLOOD PRESSURE: 132 MMHG | TEMPERATURE: 97.2 F | WEIGHT: 114.64 LBS | BODY MASS INDEX: 23.74 KG/M2 | OXYGEN SATURATION: 100 % | DIASTOLIC BLOOD PRESSURE: 81 MMHG

## 2023-01-23 LAB
BASOPHILS # BLD AUTO: 0.04 K/UL — SIGNIFICANT CHANGE UP (ref 0–0.2)
BASOPHILS NFR BLD AUTO: 0.6 % — SIGNIFICANT CHANGE UP (ref 0–2)
EOSINOPHIL # BLD AUTO: 0.14 K/UL — SIGNIFICANT CHANGE UP (ref 0–0.5)
EOSINOPHIL NFR BLD AUTO: 2.2 % — SIGNIFICANT CHANGE UP (ref 0–6)
HCT VFR BLD CALC: 38 % — SIGNIFICANT CHANGE UP (ref 34.5–45)
HGB BLD-MCNC: 12.8 G/DL — SIGNIFICANT CHANGE UP (ref 11.5–15.5)
IMM GRANULOCYTES NFR BLD AUTO: 0.2 % — SIGNIFICANT CHANGE UP (ref 0–0.9)
LYMPHOCYTES # BLD AUTO: 2.04 K/UL — SIGNIFICANT CHANGE UP (ref 1–3.3)
LYMPHOCYTES # BLD AUTO: 32.4 % — SIGNIFICANT CHANGE UP (ref 13–44)
MCHC RBC-ENTMCNC: 28.9 PG — SIGNIFICANT CHANGE UP (ref 27–34)
MCHC RBC-ENTMCNC: 33.7 G/DL — SIGNIFICANT CHANGE UP (ref 32–36)
MCV RBC AUTO: 85.8 FL — SIGNIFICANT CHANGE UP (ref 80–100)
MONOCYTES # BLD AUTO: 0.55 K/UL — SIGNIFICANT CHANGE UP (ref 0–0.9)
MONOCYTES NFR BLD AUTO: 8.7 % — SIGNIFICANT CHANGE UP (ref 2–14)
NEUTROPHILS # BLD AUTO: 3.52 K/UL — SIGNIFICANT CHANGE UP (ref 1.8–7.4)
NEUTROPHILS NFR BLD AUTO: 55.9 % — SIGNIFICANT CHANGE UP (ref 43–77)
NRBC # BLD: 0 /100 WBCS — SIGNIFICANT CHANGE UP (ref 0–0)
PLATELET # BLD AUTO: 300 K/UL — SIGNIFICANT CHANGE UP (ref 150–400)
RBC # BLD: 4.43 M/UL — SIGNIFICANT CHANGE UP (ref 3.8–5.2)
RBC # FLD: 13.5 % — SIGNIFICANT CHANGE UP (ref 10.3–14.5)
WBC # BLD: 6.3 K/UL — SIGNIFICANT CHANGE UP (ref 3.8–10.5)
WBC # FLD AUTO: 6.3 K/UL — SIGNIFICANT CHANGE UP (ref 3.8–10.5)

## 2023-01-23 PROCEDURE — 99214 OFFICE O/P EST MOD 30 MIN: CPT

## 2023-01-23 RX ORDER — TAMOXIFEN CITRATE 20 MG/1
20 TABLET, FILM COATED ORAL DAILY
Qty: 90 | Refills: 1 | Status: ACTIVE | COMMUNITY
Start: 2021-10-13 | End: 1900-01-01

## 2023-01-23 NOTE — OB HISTORY
[Regular Cycle Intervals] : periods have been irregular [Currently In Menopause] : not currently in menopause

## 2023-01-23 NOTE — PHYSICAL EXAM
[Fully active, able to carry on all pre-disease performance without restriction] : Status 0 - Fully active, able to carry on all pre-disease performance without restriction [Normal] : affect appropriate [de-identified] : right periareolar healed lumpectomy scar

## 2023-01-23 NOTE — REASON FOR VISIT
[Follow-Up Visit] : a follow-up [Other: _____] : [unfilled] [FreeTextEntry2] : ER/[place] + Right breast Ductal carcinoma in situ

## 2023-01-23 NOTE — HISTORY OF PRESENT ILLNESS
[T: ___] : T[unfilled] [N: ___] : N[unfilled] [M: ___] : M[unfilled] [AJCC Stage: ____] : AJCC Stage: [unfilled] [de-identified] : Ms.Parvin Hernadez is a 46 year old female here for an evaluation of breast cancer. Her oncologic history is as follows:\par \par She underwent routine breast imaging on 5/26/21 (BIRADS 4) the mammo showed heterogeneously dense breasts, a  right 5:00 N6 multilobulated mass 21 x 24 mm, unchanged and  a right lower outer breast 6 x 8 mm, faint nodule. The left breast was  negative. The US revealed multiple right breast masses. The largest nodule on the right is at 5:00 4 cm FN and there is a  nodule on the right at 11:00 4 cm FN for which biopsies are rec. 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 ER/MS status is positive. She is s/p right breast excisional biopsy on 9/23/21. Initial consult with me in October. Pt was recommended to see rad onc. She cx appt with rad onc, didn’t notify me. She hasn’t taken tamoxifen at all due to her fear about side effects. She is taking pomegranate vitamins. She doesn’t want to get RT. After a long discussion of risks and benefits, pt agreeable to start tamoxifen. Started tamoxifen 2/9/22 \par \par She is tolerating tamoxifen with tolerable hot flashes. Good compliance. She denies mood changes, wt gain, vaginal dryness, SOB, chest pain, leg swelling or clotting issues. Her energy and appetite is good, wt stable.  Exercises: does 20 minutes daily on treadmill. Cutting down carbs. Has 3 children 19 yo girl, 17 yo boy, 7 yo boy \par LMP date: 5/2022, Using contraception - condoms. Periods are irregular since tamoxifen\par GYN 1/2022 pelvic sono WNL:  Dr Levin, [facility], [facility], . Remidned to see annually, she is due now\par Breast imaging: with DR Forrester. Mammo/sono 7/2022, birads4. Bx benign\par Opthal: 6/2022, good exam\par

## 2023-01-23 NOTE — ASSESSMENT
[FreeTextEntry1] : Ms. 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 ER/[place] status is positive. She is s/p right breast excisional biopsy on 9/23/21. Initial consult with me in October 2021. Pt was recommended to see rad onc. She cx appt with rad onc, didn’t notify me. She hasn’t taken tamoxifen at all due to her fear about side effects. She is taking pomegranate vitamins. She doesn’t want to get RT. After a long discussion of risks and benefits, pt agreeable to start tamoxifen. Started tamoxifen 2/9/22 \par \par - Breast ca: Started tamoxifen 2/9/22. She is tolerating tamoxifen with expected side effects. Reports good compliance. Plan to continue tamoxifen for  5 years. \par GYN  Dr Levin, [facility], [facility],   and opthal f/u annually due to risk of endometrial ca and cataracts respectively.\par Annual Breast imaging with Dr. Balbina Forrester 7/2022\par - HTN: Well controlled. continue amlodipine and ASA \par -  Mild hot flashes: 2/2 tamoxifen.  Advised to wear layers and use fan prn. Consider Effexor if get worse.\par - Wt gain:Life style modifications, healthy diet and exercise d/w her\par - Irregular periods: 2/2 perimenopause.  check level today FSH/E\par - Patient is aware of signs and symptoms of DVT/PE. Patient will report if she develops acute onset chest pain shortness of breath, leg swelling or calf pain. She will report if she develops acute onset chest pain shortness of breath, leg swelling or calf pain. \par RTC6 m. \par

## 2023-01-24 LAB
ALBUMIN SERPL ELPH-MCNC: 4.5 G/DL
ALP BLD-CCNC: 68 U/L
ALT SERPL-CCNC: 26 U/L
ANION GAP SERPL CALC-SCNC: 9 MMOL/L
AST SERPL-CCNC: 21 U/L
BILIRUB SERPL-MCNC: 0.5 MG/DL
BUN SERPL-MCNC: 9 MG/DL
CALCIUM SERPL-MCNC: 10 MG/DL
CHLORIDE SERPL-SCNC: 106 MMOL/L
CO2 SERPL-SCNC: 28 MMOL/L
CREAT SERPL-MCNC: 0.49 MG/DL
EGFR: 115 ML/MIN/1.73M2
ESTRADIOL SERPL-MCNC: 12 PG/ML
FSH SERPL-MCNC: 22 IU/L
GLUCOSE SERPL-MCNC: 90 MG/DL
POTASSIUM SERPL-SCNC: 3.9 MMOL/L
PROT SERPL-MCNC: 8 G/DL
SODIUM SERPL-SCNC: 143 MMOL/L

## 2023-02-07 ENCOUNTER — APPOINTMENT (OUTPATIENT)
Dept: ULTRASOUND IMAGING | Facility: IMAGING CENTER | Age: 48
End: 2023-02-07
Payer: MEDICAID

## 2023-02-07 ENCOUNTER — OUTPATIENT (OUTPATIENT)
Dept: OUTPATIENT SERVICES | Facility: HOSPITAL | Age: 48
LOS: 1 days | End: 2023-02-07
Payer: MEDICAID

## 2023-02-07 ENCOUNTER — RESULT REVIEW (OUTPATIENT)
Age: 48
End: 2023-02-07

## 2023-02-07 ENCOUNTER — APPOINTMENT (OUTPATIENT)
Dept: MAMMOGRAPHY | Facility: IMAGING CENTER | Age: 48
End: 2023-02-07
Payer: MEDICAID

## 2023-02-07 DIAGNOSIS — Z00.8 ENCOUNTER FOR OTHER GENERAL EXAMINATION: ICD-10-CM

## 2023-02-07 DIAGNOSIS — Z98.891 HISTORY OF UTERINE SCAR FROM PREVIOUS SURGERY: Chronic | ICD-10-CM

## 2023-02-07 PROCEDURE — 76642 ULTRASOUND BREAST LIMITED: CPT | Mod: 26,RT

## 2023-02-07 PROCEDURE — 76642 ULTRASOUND BREAST LIMITED: CPT

## 2023-04-28 ENCOUNTER — EMERGENCY (EMERGENCY)
Facility: HOSPITAL | Age: 48
LOS: 1 days | Discharge: HOME | End: 2023-04-28
Admitting: EMERGENCY MEDICINE
Payer: MEDICAID

## 2023-04-28 VITALS
OXYGEN SATURATION: 100 % | TEMPERATURE: 98 F | DIASTOLIC BLOOD PRESSURE: 96 MMHG | RESPIRATION RATE: 16 BRPM | SYSTOLIC BLOOD PRESSURE: 146 MMHG | HEART RATE: 82 BPM

## 2023-04-28 DIAGNOSIS — Z98.891 HISTORY OF UTERINE SCAR FROM PREVIOUS SURGERY: Chronic | ICD-10-CM

## 2023-04-28 PROCEDURE — 99283 EMERGENCY DEPT VISIT LOW MDM: CPT

## 2023-04-28 NOTE — ED PROVIDER NOTE - OBJECTIVE STATEMENT
Patient is a 48-year-old female with past medical history of hypertension presents with tooth pain x5 days.  Patient reports developing constant pain at tooth #5 and #31.  Patient reports pain worsens with chewing and drinking hot beverages.  Patient denies any fevers, chills, headache, facial swelling, trismus, difficulty/pain swallowing, shortness of breath.

## 2023-04-28 NOTE — ED PROVIDER NOTE - PROGRESS NOTE DETAILS
LARA COLMENARES:  Pt medically stable for discharge.  Strict return precautions given.  Pt to follow up with PMD and dental.

## 2023-04-28 NOTE — ED PROVIDER NOTE - TOOTH FINDINGS
no vestibular tenderness/swelling; no overlying facial swelling; no percussion tenderness; teeth nonmobile; floor of mouth soft, nontender w/o swelling; no submental or submandibular swelling/tenderness

## 2023-04-28 NOTE — ED ADULT TRIAGE NOTE - CHIEF COMPLAINT QUOTE
Pt c/o right dental pain x 5 days. Pt states pain is in the right rear upper and lower. pt states its very sensitive. No complaints of chest pain, headache, nausea, dizziness, vomiting  SOB, fever, chills verbalized..

## 2023-04-28 NOTE — ED PROVIDER NOTE - NSFOLLOWUPINSTRUCTIONS_ED_ALL_ED_FT
Advance activity as tolerated.  Continue all previously prescribed medications as directed unless otherwise instructed.  Take Motrin (also sold as Advil or Ibuprofen) 400-600 mg (two or three 200 mg over the counter pills) every 8 hours as needed for moderate pain or fevers-- take with food; do not take for more than 5 consecutive days. Take Tylenol 650mg (Two 325 mg pills) every 4-6 hours as needed for pain or fevers.  Follow up with your primary care physician and dental (call [phone] to make an appointment)  in 48-72 hours- bring copies of your results.  Return to the ER for worsening or persistent symptoms, including but not limited to worsening/persistent pain, fevers, difficulty opening mouth, facial swelling, tongue swelling, difficulty breathing, difficulty swallowing, and/or ANY NEW OR CONCERNING SYMPTOMS. If you have issues obtaining follow up, please call: [phone] to obtain a doctor or specialist who takes your insurance in your area.  You may call [phone] to make an appointment with the internal medicine clinic.     TOOTHACHE - AfterCare(R) Instructions(ER/ED)    Toothache    WHAT YOU NEED TO KNOW:    A toothache is pain that is caused by irritation of the nerves in the center of your tooth. The irritation may be caused by several problems, such as a cavity, an infection, a cracked tooth, or gum disease.  Tooth Anatomy    DISCHARGE INSTRUCTIONS:    Return to the emergency department if:    You have trouble breathing or swallowing.    You have swelling in your face or neck.  Contact your dentist if:    You have a fever and chills.    You have trouble opening or closing your mouth.    You have swelling around your tooth.    You have questions or concerns about your condition or care.  Medicines: You may need any of the following:    NSAIDs, such as ibuprofen, help decrease swelling, pain, and fever. This medicine is available with or without a doctor's order. NSAIDs can cause stomach bleeding or kidney problems in certain people. If you take blood thinner medicine, always ask if NSAIDs are safe for you. Always read the medicine label and follow directions. Do not give these medicines to children younger than 6 months without direction from a healthcare provider.    Acetaminophen decreases pain and fever. It is available without a doctor's order. Ask how much to take and how often to take it. Follow directions. Acetaminophen can cause liver damage if not taken correctly.    Prescription pain medicine may be given. Ask your healthcare provider how to take this medicine safely. Some prescription pain medicines contain acetaminophen. Do not take other medicines that contain acetaminophen without talking to your healthcare provider. Too much acetaminophen may cause liver damage. Prescription pain medicine may cause constipation. Ask your healthcare provider how to prevent or treat constipation.    Antibiotics help treat or prevent a bacterial infection.    Take your medicine as directed. Contact your healthcare provider if you think your medicine is not helping or if you have side effects. Tell your provider if you are allergic to any medicine. Keep a list of the medicines, vitamins, and herbs you take. Include the amounts, and when and why you take them. Bring the list or the pill bottles to follow-up visits. Carry your medicine list with you in case of an emergency.  Self-care:    Rinse your mouth with warm salt water 4 times a day or as directed.    Eat soft foods to help relieve pain caused by chewing.    Apply ice on your jaw or cheek for 15 to 20 minutes every hour or as directed. Use an ice pack, or put crushed ice in a plastic bag. Cover it with a towel before you apply it. Ice helps prevent tissue damage and decreases swelling and pain.  Help prevent a toothache:    Brush your teeth at least 2 times a day.    Use dental floss to clean between your teeth at least 1 time a day.    See your dentist regularly every 6 months for dental cleanings and oral exams.  Follow up with your dentist as directed: You may be referred to a dental surgeon. Write down your questions so you remember to ask them during your visits.    © [employer] 1973, 2023

## 2023-04-28 NOTE — ED PROVIDER NOTE - CLINICAL SUMMARY MEDICAL DECISION MAKING FREE TEXT BOX
Patient is a 48-year-old female with past medical history of hypertension presents with tooth pain x5 days.  Patient reports developing constant pain at tooth #5 and #31.  Patient reports pain worsens with chewing and drinking hot beverages.  Patient denies any fevers, chills, headache, facial swelling, trismus, difficulty/pain swallowing, shortness of breath.  This is a patient with dental pain; very low clinical suspicion for disease processes including but not limited to dental abscess, Terell angina.  There is no indication for emergent dental consult at this time more CT scan.  Plan for discharge with outpatient dental follow-up.

## 2023-04-28 NOTE — ED PROVIDER NOTE - WET READ LAUNCH FT
There are no Wet Read(s) to document. No Repair - Repaired With Adjacent Surgical Defect Text (Leave Blank If You Do Not Want): After obtaining clear surgical margins the defect was repaired concurrently with another surgical defect which was in close approximation.

## 2023-04-28 NOTE — ED ADULT TRIAGE NOTE - HEART RATE (BEATS/MIN)
Spoke to pt's mother Giselle Mercado (on HIPAA) regarding recommendations below - she verbalizes understanding. She did have concerns if prednisone would impact pt's bipolar medication, lithium.  I did enter lithium, omnicef, and prednisone in [employer] - no  82

## 2023-04-28 NOTE — ED PROVIDER NOTE - PATIENT PORTAL LINK FT
You can access the FollowMyHealth Patient Portal offered by [facility] by registering at the following website: [email or web address]. By joining [facility]’s FollowMyHealth portal, you will also be able to view your health information using other applications (apps) compatible with our system.

## 2023-07-20 ENCOUNTER — OUTPATIENT (OUTPATIENT)
Dept: OUTPATIENT SERVICES | Facility: HOSPITAL | Age: 48
LOS: 1 days | Discharge: HOME | End: 2023-07-20

## 2023-07-20 DIAGNOSIS — D64.9 ANEMIA, UNSPECIFIED: ICD-10-CM

## 2023-07-20 DIAGNOSIS — Z98.891 HISTORY OF UTERINE SCAR FROM PREVIOUS SURGERY: Chronic | ICD-10-CM

## 2023-07-31 ENCOUNTER — APPOINTMENT (OUTPATIENT)
Dept: HEMATOLOGY ONCOLOGY | Facility: CLINIC | Age: 48
End: 2023-07-31

## 2023-08-18 ENCOUNTER — APPOINTMENT (OUTPATIENT)
Dept: HEMATOLOGY ONCOLOGY | Facility: CLINIC | Age: 48
End: 2023-08-18
Payer: MEDICAID

## 2023-08-18 ENCOUNTER — RESULT REVIEW (OUTPATIENT)
Age: 48
End: 2023-08-18

## 2023-08-18 VITALS
SYSTOLIC BLOOD PRESSURE: 133 MMHG | TEMPERATURE: 97.9 F | WEIGHT: 122.8 LBS | RESPIRATION RATE: 16 BRPM | HEART RATE: 89 BPM | OXYGEN SATURATION: 98 % | BODY MASS INDEX: 25.43 KG/M2 | DIASTOLIC BLOOD PRESSURE: 80 MMHG

## 2023-08-18 PROCEDURE — 99214 OFFICE O/P EST MOD 30 MIN: CPT

## 2023-08-24 NOTE — REASON FOR VISIT
[Follow-Up Visit] : a follow-up [Other: _____] : [unfilled] [FreeTextEntry2] : ER/[place] + Right breast Ductal carcinoma in situ

## 2023-08-24 NOTE — ASSESSMENT
[FreeTextEntry1] : Ms. 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 ER/[place] status is positive. She is s/p right breast excisional biopsy on 9/23/21. Initial consult with me in October 2021. Pt was recommended to see rad onc. She cx appt with rad onc, didn't notify me. She hasn't taken tamoxifen at all due to her fear about side effects. She is taking pomegranate vitamins. She doesn't want to get RT. After a long discussion of risks and benefits, pt agreeable to start tamoxifen. Started tamoxifen 2/9/22   - Breast ca: Started tamoxifen 2/9/22. She is tolerating tamoxifen with expected side effects. Reports good compliance. Plan to continue tamoxifen for  5 years.  GYN  Dr Levin, [facility], [facility],   and opthal f/u annually due to risk of endometrial ca and cataracts respectively. Annual Breast imaging DUE ordered in allscripts - HTN: Well controlled. continue amlodipine and ASA  -  Mild hot flashes: 2/2 tamoxifen.  Tolerable Advised to wear layers and use fan prn. Consider Effexor if get worse. - Wt gain gained 3 kg since last visit. :Life style modifications, healthy diet and exercise d/w her - Irregular periods: 2/2 perimenopause.  1/2023 FSH/E not in menopausal range FSH< 25 will rerepeat prn. - Patient is aware of signs and symptoms of DVT/PE. Patient will report if she develops acute onset chest pain shortness of breath, leg swelling or calf pain. She will report if she develops acute onset chest pain shortness of breath, leg swelling or calf pain.  RTC6 m.

## 2023-08-24 NOTE — PHYSICAL EXAM
[Fully active, able to carry on all pre-disease performance without restriction] : Status 0 - Fully active, able to carry on all pre-disease performance without restriction [Normal] : affect appropriate [de-identified] : right periareolar healed lumpectomy scar

## 2023-08-24 NOTE — HISTORY OF PRESENT ILLNESS
[T: ___] : T[unfilled] [N: ___] : N[unfilled] [M: ___] : M[unfilled] [AJCC Stage: ____] : AJCC Stage: [unfilled] [de-identified] : Ms.Parvin Hernadez is a 46 year old female here for an evaluation of breast cancer. Her oncologic history is as follows:\par  \par  She underwent routine breast imaging on 5/26/21 (BIRADS 4) the mammo showed heterogeneously dense breasts, a  right 5:00 N6 multilobulated mass 21 x 24 mm, unchanged and  a right lower outer breast 6 x 8 mm, faint nodule. The left breast was  negative. The US revealed multiple right breast masses. The largest nodule on the right is at 5:00 4 cm FN and there is a  nodule on the right at 11:00 4 cm FN for which biopsies are rec. 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 ER/DC status is positive. She is s/p right breast excisional biopsy on 9/23/21. Initial consult with me in October. Pt was recommended to see rad onc. She cx appt with rad onc, didn't notify me. She hasn't taken tamoxifen at all due to her fear about side effects. She is taking pomegranate vitamins. She doesn't want to get RT. After a long discussion of risks and benefits, pt agreeable to start tamoxifen. Started tamoxifen 2/9/22   She is tolerating tamoxifen with tolerable hot flashes. Good compliance. She denies mood changes, wt gain, vaginal dryness, SOB, chest pain, leg swelling or clotting issues. Her energy and appetite is good, wt stable.  Exercises: does 20 minutes daily on treadmill. Cutting down carbs. Has 3 children   LMP date: 5/2022, Using contraception - condoms. Periods are irregular since tamoxifen GYN  2/2023 no pelvic sono in 2023 at that time 1/2022 pelvic sono WNL:  Dr Levin, [facility], [facility], [phone]. Breast imaging: with DR Forrester. Mammo/sono 7/2022, birads4. Bx benign  RIght breast US  2/2023 BI-RADS 3 Stable benign-appearing mass 8:00, right breast. Opthal: 6/2022, good exam

## 2023-09-08 ENCOUNTER — APPOINTMENT (OUTPATIENT)
Dept: MAMMOGRAPHY | Facility: IMAGING CENTER | Age: 48
End: 2023-09-08

## 2023-09-08 ENCOUNTER — APPOINTMENT (OUTPATIENT)
Dept: ULTRASOUND IMAGING | Facility: IMAGING CENTER | Age: 48
End: 2023-09-08

## 2023-11-06 ENCOUNTER — OUTPATIENT (OUTPATIENT)
Dept: OUTPATIENT SERVICES | Facility: HOSPITAL | Age: 48
LOS: 1 days | End: 2023-11-06
Payer: MEDICAID

## 2023-11-06 ENCOUNTER — APPOINTMENT (OUTPATIENT)
Dept: ULTRASOUND IMAGING | Facility: IMAGING CENTER | Age: 48
End: 2023-11-06
Payer: MEDICAID

## 2023-11-06 ENCOUNTER — APPOINTMENT (OUTPATIENT)
Dept: MAMMOGRAPHY | Facility: IMAGING CENTER | Age: 48
End: 2023-11-06
Payer: MEDICAID

## 2023-11-06 ENCOUNTER — RESULT REVIEW (OUTPATIENT)
Age: 48
End: 2023-11-06

## 2023-11-06 DIAGNOSIS — Z98.891 HISTORY OF UTERINE SCAR FROM PREVIOUS SURGERY: Chronic | ICD-10-CM

## 2023-11-06 DIAGNOSIS — D05.11 INTRADUCTAL CARCINOMA IN SITU OF RIGHT BREAST: ICD-10-CM

## 2023-11-06 PROCEDURE — 77063 BREAST TOMOSYNTHESIS BI: CPT | Mod: 26

## 2023-11-06 PROCEDURE — 77063 BREAST TOMOSYNTHESIS BI: CPT

## 2023-11-06 PROCEDURE — 77067 SCR MAMMO BI INCL CAD: CPT | Mod: 26

## 2023-11-06 PROCEDURE — 76641 ULTRASOUND BREAST COMPLETE: CPT | Mod: 26,50

## 2023-11-06 PROCEDURE — 76641 ULTRASOUND BREAST COMPLETE: CPT

## 2023-11-06 PROCEDURE — 77067 SCR MAMMO BI INCL CAD: CPT

## 2023-12-20 ENCOUNTER — NON-APPOINTMENT (OUTPATIENT)
Age: 48
End: 2023-12-20

## 2024-03-06 ENCOUNTER — OUTPATIENT (OUTPATIENT)
Dept: OUTPATIENT SERVICES | Facility: HOSPITAL | Age: 49
LOS: 1 days | Discharge: HOME | End: 2024-03-06

## 2024-03-06 DIAGNOSIS — Z98.891 HISTORY OF UTERINE SCAR FROM PREVIOUS SURGERY: Chronic | ICD-10-CM

## 2024-03-06 DIAGNOSIS — D64.9 ANEMIA, UNSPECIFIED: ICD-10-CM

## 2024-03-15 ENCOUNTER — RESULT REVIEW (OUTPATIENT)
Age: 49
End: 2024-03-15

## 2024-03-15 ENCOUNTER — APPOINTMENT (OUTPATIENT)
Dept: HEMATOLOGY ONCOLOGY | Facility: CLINIC | Age: 49
End: 2024-03-15
Payer: MEDICAID

## 2024-03-15 VITALS
DIASTOLIC BLOOD PRESSURE: 79 MMHG | RESPIRATION RATE: 16 BRPM | BODY MASS INDEX: 24.19 KG/M2 | WEIGHT: 116.84 LBS | OXYGEN SATURATION: 100 % | HEART RATE: 82 BPM | TEMPERATURE: 98.6 F | SYSTOLIC BLOOD PRESSURE: 120 MMHG

## 2024-03-15 DIAGNOSIS — Z79.810 LONG TERM (CURRENT) USE OF SELECTIVE ESTROGEN RECEPTOR MODULATORS (SERMS): ICD-10-CM

## 2024-03-15 DIAGNOSIS — I10 ESSENTIAL (PRIMARY) HYPERTENSION: ICD-10-CM

## 2024-03-15 DIAGNOSIS — R23.2 FLUSHING: ICD-10-CM

## 2024-03-15 DIAGNOSIS — D05.11 INTRADUCTAL CARCINOMA IN SITU OF RIGHT BREAST: ICD-10-CM

## 2024-03-15 LAB
BASOPHILS # BLD AUTO: 0.03 K/UL — SIGNIFICANT CHANGE UP (ref 0–0.2)
BASOPHILS NFR BLD AUTO: 0.7 % — SIGNIFICANT CHANGE UP (ref 0–2)
EOSINOPHIL # BLD AUTO: 0.09 K/UL — SIGNIFICANT CHANGE UP (ref 0–0.5)
EOSINOPHIL NFR BLD AUTO: 2 % — SIGNIFICANT CHANGE UP (ref 0–6)
HCT VFR BLD CALC: 37.2 % — SIGNIFICANT CHANGE UP (ref 34.5–45)
HGB BLD-MCNC: 12.7 G/DL — SIGNIFICANT CHANGE UP (ref 11.5–15.5)
IMM GRANULOCYTES NFR BLD AUTO: 0.2 % — SIGNIFICANT CHANGE UP (ref 0–0.9)
LYMPHOCYTES # BLD AUTO: 1.54 K/UL — SIGNIFICANT CHANGE UP (ref 1–3.3)
LYMPHOCYTES # BLD AUTO: 33.6 % — SIGNIFICANT CHANGE UP (ref 13–44)
MCHC RBC-ENTMCNC: 29.7 PG — SIGNIFICANT CHANGE UP (ref 27–34)
MCHC RBC-ENTMCNC: 34.1 G/DL — SIGNIFICANT CHANGE UP (ref 32–36)
MCV RBC AUTO: 86.9 FL — SIGNIFICANT CHANGE UP (ref 80–100)
MONOCYTES # BLD AUTO: 0.41 K/UL — SIGNIFICANT CHANGE UP (ref 0–0.9)
MONOCYTES NFR BLD AUTO: 9 % — SIGNIFICANT CHANGE UP (ref 2–14)
NEUTROPHILS # BLD AUTO: 2.5 K/UL — SIGNIFICANT CHANGE UP (ref 1.8–7.4)
NEUTROPHILS NFR BLD AUTO: 54.5 % — SIGNIFICANT CHANGE UP (ref 43–77)
NRBC # BLD: 0 /100 WBCS — SIGNIFICANT CHANGE UP (ref 0–0)
PLATELET # BLD AUTO: 248 K/UL — SIGNIFICANT CHANGE UP (ref 150–400)
RBC # BLD: 4.28 M/UL — SIGNIFICANT CHANGE UP (ref 3.8–5.2)
RBC # FLD: 13.4 % — SIGNIFICANT CHANGE UP (ref 10.3–14.5)
WBC # BLD: 4.58 K/UL — SIGNIFICANT CHANGE UP (ref 3.8–10.5)
WBC # FLD AUTO: 4.58 K/UL — SIGNIFICANT CHANGE UP (ref 3.8–10.5)

## 2024-03-15 PROCEDURE — 99214 OFFICE O/P EST MOD 30 MIN: CPT

## 2024-03-15 PROCEDURE — G2211 COMPLEX E/M VISIT ADD ON: CPT | Mod: NC,1L

## 2024-03-19 LAB
ALBUMIN SERPL ELPH-MCNC: 4.6 G/DL
ALP BLD-CCNC: 68 U/L
ALT SERPL-CCNC: 26 U/L
ANION GAP SERPL CALC-SCNC: 11 MMOL/L
AST SERPL-CCNC: 24 U/L
BILIRUB SERPL-MCNC: 0.5 MG/DL
BUN SERPL-MCNC: 8 MG/DL
CALCIUM SERPL-MCNC: 9.4 MG/DL
CHLORIDE SERPL-SCNC: 109 MMOL/L
CO2 SERPL-SCNC: 25 MMOL/L
CREAT SERPL-MCNC: 0.56 MG/DL
EGFR: 111 ML/MIN/1.73M2
ESTRADIOL SERPL-MCNC: 6 PG/ML
FSH SERPL-MCNC: 22.7 IU/L
GLUCOSE SERPL-MCNC: 92 MG/DL
POTASSIUM SERPL-SCNC: 4.2 MMOL/L
PROT SERPL-MCNC: 8.1 G/DL
SODIUM SERPL-SCNC: 145 MMOL/L

## 2024-03-23 PROBLEM — I10 HTN (HYPERTENSION), BENIGN: Status: ACTIVE | Noted: 2021-10-13

## 2024-03-23 PROBLEM — R23.2 HOT FLASHES: Status: ACTIVE | Noted: 2022-04-13

## 2024-03-23 PROBLEM — Z79.810 USE OF TAMOXIFEN (NOLVADEX): Status: ACTIVE | Noted: 2022-04-13

## 2024-03-23 RX ORDER — TAMOXIFEN CITRATE 20 MG/1
20 TABLET, FILM COATED ORAL DAILY
Qty: 1 | Refills: 1 | Status: ACTIVE | COMMUNITY
Start: 2022-02-09 | End: 1900-01-01

## 2024-03-23 RX ORDER — ASPIRIN ENTERIC COATED TABLETS 81 MG 81 MG/1
81 TABLET, DELAYED RELEASE ORAL
Qty: 90 | Refills: 2 | Status: ACTIVE | COMMUNITY
Start: 2021-10-13 | End: 1900-01-01

## 2024-03-23 NOTE — PHYSICAL EXAM
[Fully active, able to carry on all pre-disease performance without restriction] : Status 0 - Fully active, able to carry on all pre-disease performance without restriction [Normal] : affect appropriate [de-identified] : right periareolar healed lumpectomy scar

## 2024-03-23 NOTE — ASSESSMENT
[FreeTextEntry1] : Ms. 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 ER/[place] status is positive. She is s/p right breast excisional biopsy on 9/23/21. Initial consult with me in October 2021. Pt was recommended to see rad onc. She cx appt with rad onc, didn't notify me. She hasn't taken tamoxifen at all due to her fear about side effects. She is taking pomegranate vitamins. She doesn't want to get RT. After a long discussion of risks and benefits, pt agreeable to start tamoxifen. Started tamoxifen 2/9/22   - Breast ca: Started tamoxifen 2/9/22. She is tolerating tamoxifen with expected side effects. Reports good compliance. Plan to continue tamoxifen for  5 years.  GYN and opthal f/u annually due to risk of endometrial ca and cataracts respectively. Annual Breast imaging reviewed - HTN: Well controlled. continue amlodipine and ASA  -  Mild hot flashes: 2/2 tamoxifen.  Tolerable Advised to wear layers and use fan prn. Consider Effexor if get worse. - Life style modifications, healthy diet and exercise d/w her - Irregular periods: 2/2 perimenopause.  1/2023 FSH/E not in menopausal range FSH repeated today 3/2024 is still under 25 - Patient is aware of signs and symptoms of DVT/PE. Patient will report if she develops acute onset chest pain shortness of breath, leg swelling or calf pain. She will report if she develops acute onset chest pain shortness of breath, leg swelling or calf pain.  RTC6 m.

## 2024-03-23 NOTE — REASON FOR VISIT
[Follow-Up Visit] : a follow-up [Other: _____] : [unfilled] [FreeTextEntry2] : ER/[place] + Right breast Ductal carcinoma in situ

## 2024-03-23 NOTE — HISTORY OF PRESENT ILLNESS
[T: ___] : T[unfilled] [N: ___] : N[unfilled] [M: ___] : M[unfilled] [AJCC Stage: ____] : AJCC Stage: [unfilled] [de-identified] : Ms. 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 ER/UT status is positive. She is s/p right breast excisional biopsy on 9/23/21. Initial consult with me in October. Pt was recommended to see rad onc. She cx appt with rad onc, didn't notify me. She hasn't taken tamoxifen at all due to her fear about side effects. She is taking pomegranate vitamins. She doesn't want to get RT. After a long discussion of risks and benefits, pt agreeable to start tamoxifen. Started tamoxifen 2/9/22   3/2024 She is tolerating tamoxifen with tolerable hot flashes. Good compliance. She denies mood changes, wt gain, vaginal dryness, SOB, chest pain, leg swelling or clotting issues. Her energy and appetite is good.  Exercises: does 20 minutes daily on treadmill. Cutting down carbs. lost 6 lbs. Has 3 children   LMP date: 5/2022, Using contraception - condoms. Periods are irregular since tamoxifen. Will check FSH Estradiol . FSH 22, will continue angela until FSH > 25 GYN  2/2023 no pelvic sono in 2023 at that time 1/2022 pelvic sono WNL:  Dr Levin, [facility], [facility], . Breast imaging: with DR Forrester. Mammo/sono 7/2022, birads4. Bx benign  RIght breast US  2/2023 BI-RADS 3 Stable benign-appearing mass 8:00, right breast. Opthal: 6/2022, good exam  [de-identified] : Ms.Parvin Hernadez is a 46 year old female here for an evaluation of breast cancer. Her oncologic history is as follows:\par  \par  She underwent routine breast imaging on 5/26/21 (BIRADS 4) the mammo showed heterogeneously dense breasts, a  right 5:00 N6 multilobulated mass 21 x 24 mm, unchanged and  a right lower outer breast 6 x 8 mm, faint nodule. The left breast was  negative. The US revealed multiple right breast masses. The largest nodule on the right is at 5:00 4 cm FN and there is a  nodule on the right at 11:00 4 cm FN for which biopsies are rec. 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

## 2024-09-20 ENCOUNTER — OUTPATIENT (OUTPATIENT)
Dept: OUTPATIENT SERVICES | Facility: HOSPITAL | Age: 49
LOS: 1 days | Discharge: HOME | End: 2024-09-20

## 2024-09-20 DIAGNOSIS — D64.9 ANEMIA, UNSPECIFIED: ICD-10-CM

## 2024-09-20 DIAGNOSIS — Z98.891 HISTORY OF UTERINE SCAR FROM PREVIOUS SURGERY: Chronic | ICD-10-CM

## 2024-09-23 ENCOUNTER — APPOINTMENT (OUTPATIENT)
Dept: HEMATOLOGY ONCOLOGY | Facility: CLINIC | Age: 49
End: 2024-09-23
Payer: COMMERCIAL

## 2024-09-23 VITALS
HEART RATE: 88 BPM | WEIGHT: 115.84 LBS | HEIGHT: 58.27 IN | TEMPERATURE: 97 F | OXYGEN SATURATION: 98 % | RESPIRATION RATE: 17 BRPM | DIASTOLIC BLOOD PRESSURE: 72 MMHG | SYSTOLIC BLOOD PRESSURE: 116 MMHG | BODY MASS INDEX: 23.99 KG/M2

## 2024-09-23 DIAGNOSIS — D05.11 INTRADUCTAL CARCINOMA IN SITU OF RIGHT BREAST: ICD-10-CM

## 2024-09-23 DIAGNOSIS — Z79.810 LONG TERM (CURRENT) USE OF SELECTIVE ESTROGEN RECEPTOR MODULATORS (SERMS): ICD-10-CM

## 2024-09-23 PROCEDURE — G2211 COMPLEX E/M VISIT ADD ON: CPT | Mod: NC

## 2024-09-23 PROCEDURE — 99214 OFFICE O/P EST MOD 30 MIN: CPT

## 2024-09-23 NOTE — PHYSICAL EXAM
[Fully active, able to carry on all pre-disease performance without restriction] : Status 0 - Fully active, able to carry on all pre-disease performance without restriction [Normal] : affect appropriate [de-identified] : right periareolar healed lumpectomy scar

## 2024-09-23 NOTE — PHYSICAL EXAM
[Fully active, able to carry on all pre-disease performance without restriction] : Status 0 - Fully active, able to carry on all pre-disease performance without restriction [Normal] : affect appropriate [de-identified] : right periareolar healed lumpectomy scar

## 2024-09-23 NOTE — ASSESSMENT
[FreeTextEntry1] : Ms. 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 ER/[place] status is positive. She is s/p right breast excisional biopsy on 9/23/21. Initial consult with me in October 2021. Pt was recommended to see rad onc. She cx appt with rad onc, didn't notify me. She hasn't taken tamoxifen at all due to her fear about side effects. She is taking pomegranate vitamins. She doesn't want to get RT. After a long discussion of risks and benefits, pt agreeable to start tamoxifen. Started tamoxifen 2/9/22   - Breast ca: Started tamoxifen 2/9/22. She is tolerating tamoxifen with expected side effects. Reports good compliance. Plan to continue tamoxifen for  5 years.  GYN and opthal f/u annually due to risk of endometrial ca and cataracts respectively. Annual Breast imaging reviewed - HTN: Well controlled. continue amlodipine and ASA  -  Mild hot flashes: 2/2 tamoxifen.  Tolerable Advised to wear layers and use fan prn. Consider Effexor if get worse. - Life style modifications, healthy diet and exercise d/w her - Irregular periods: 2/2 perimenopause.  1/2023 FSH/E not in menopausal range FSH repeated today 3/2024 is still under 25 - Patient is aware of signs and symptoms of DVT/PE. Patient will report if she develops acute onset chest pain shortness of breath, leg swelling or calf pain. She will report if she develops acute onset chest pain shortness of breath, leg swelling or calf pain.  RTC6 m.

## 2024-09-23 NOTE — HISTORY OF PRESENT ILLNESS
[T: ___] : T[unfilled] [N: ___] : N[unfilled] [M: ___] : M[unfilled] [AJCC Stage: ____] : AJCC Stage: [unfilled] [de-identified] : Ms.Parvin Hernadez is a 46 year old female here for an evaluation of breast cancer. Her oncologic history is as follows:  She underwent routine breast imaging on 5/26/21 (BIRADS 4) the mammo showed heterogeneously dense breasts, a  right 5:00 N6 multilobulated mass 21 x 24 mm, unchanged and  a right lower outer breast 6 x 8 mm, faint nodule. The left breast was  negative. The US revealed multiple right breast masses. The largest nodule on the right is at 5:00 4 cm FN and there is a  nodule on the right at 11:00 4 cm FN for which biopsies are rec. The other nodules, ( R 12:00 N5 3 x 5 x 2 mm cyst, R 8:00 N5 8 x 5 x 8 mm hypoechoic mass, complicated cyst vs FA and L 8:00 N7 3 mm cyst) can be followed at 6 month intervals.   She underwent right breast 5:00 4cm FN and right breast 11:00 4 cm FN core biopsies on 7/12/21: the right breast 5:00 biopsy showed  atypical ductal hyperplasia involving a fibroadenoma, the right breast 11:00 biopsy showed a fibroadenoma.  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 Good compliance. She denies mood changes, wt gain, vaginal dryness, SOB, chest pain, leg swelling or clotting issues. Her energy and appetite is good.  Exercises: does 20 minutes daily on treadmill. Cutting down carbs. lost 6 lbs. Has 3 children   LMP date: 5/2022, Using contraception - condoms. Periods are irregular since tamoxifen. Will check FSH Estradiol . FSH 22, will continue angela until FSH > 25 GYN  2/2023 no pelvic sono in 2023 at that time 1/2022 pelvic sono WNL:  Dr Levin, [facility], . Breast imaging: with DR Forrester. Mammo/sono 7/2022, birads4. Bx benign  RIght breast US  2/2023 BI-RADS 3 Stable benign-appearing mass 8:00, right breast. Opthal: 6/2022, good exam [de-identified] : Ms. LASHON KHAN is a 49-year-old premenopausal female with PMH of HTN who is diagnosed with DCIS of the right breast stage 0  ( pTis, Nx, M na), 4 mm in size, present in 3/11 blocks, low grade, no necrosis and negative margins. ER/[place] status is positive. She is s/p right breast excisional biopsy on 9/23/21. Initial consult with me in October. Pt was recommended to see rad oncology. She cx appt with rad oncology, didn't notify me. She hasn't taken tamoxifen at all due to her fear about side effects. She is taking pomegranate vitamins. She doesn't want to get RT. After a long discussion of risks and benefits, pt agreeable to start tamoxifen. Started tamoxifen 2/9/22 9/23/24: Patient presents for 6-month follow-up. She continues to take Tamoxifen with good efficacy. She denies any significant s/e consistent with: fatigue, vaginal dryness, SOB, chest pain, leg swelling, pain, fatigue, and mood or sleep disturbances. LMP: very irregular; reports las time it was regular was prior to Tamoxifen Bilateral Diagnostic Mammogram and Bilateral Complete Breast US completed on 11/6/2023; BI-RADS 2 UTD with GYN: appointment scheduled for December 2024. RTC: 6 Months

## 2024-09-23 NOTE — ASSESSMENT
[FreeTextEntry1] : Ms. 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 ER/[place] status is positive. She is s/p right breast excisional biopsy on 9/23/21. Initial consult with me in October 2021. Pt was recommended to see rad onc. She cx appt with rad onc, didn't notify me. She hasn't taken tamoxifen at all due to her fear about side effects. She is taking pomegranate vitamins. She doesn't want to get RT. After a long discussion of risks and benefits, pt agreeable to start tamoxifen. Started tamoxifen 2/9/22   - Breast ca: Started tamoxifen 2/9/22. She is tolerating tamoxifen with expected side effects. Reports good compliance. Plan to continue tamoxifen for  5 years.  GYN and opthal f/u annually due to risk of endometrial ca and cataracts respectively. Annual Breast imaging reviewed - HTN: Well controlled. continue amlodipine and ASA  -  Mild hot flashes: 2/2 tamoxifen.  Tolerable Advised to wear layers and use fan prn. Consider Effexor if get worse. - Life style modifications, healthy diet and exercise d/w her - Irregular periods: 2/2 perimenopause.  1/2023 FSH/E not in menopausal range FSH repeated today 3/2024 is still under 25 - Patient is aware of signs and symptoms of DVT/PE. Patient will report if she develops acute onset chest pain shortness of breath, leg swelling or calf pain. She will report if she develops acute onset chest pain shortness of breath, leg swelling or calf pain.  RTC6 m.

## 2024-09-23 NOTE — REASON FOR VISIT
[Follow-Up Visit] : a follow-up [Other: _____] : [unfilled] [FreeTextEntry2] : ER/TX + Right breast Ductal carcinoma in situ

## 2024-09-23 NOTE — REASON FOR VISIT
[Follow-Up Visit] : a follow-up [Other: _____] : [unfilled] [FreeTextEntry2] : ER/KS + Right breast Ductal carcinoma in situ

## 2024-09-23 NOTE — HISTORY OF PRESENT ILLNESS
[T: ___] : T[unfilled] [N: ___] : N[unfilled] [M: ___] : M[unfilled] [AJCC Stage: ____] : AJCC Stage: [unfilled] [de-identified] : Ms.Parvin Hernadez is a 46 year old female here for an evaluation of breast cancer. Her oncologic history is as follows:  She underwent routine breast imaging on 5/26/21 (BIRADS 4) the mammo showed heterogeneously dense breasts, a  right 5:00 N6 multilobulated mass 21 x 24 mm, unchanged and  a right lower outer breast 6 x 8 mm, faint nodule. The left breast was  negative. The US revealed multiple right breast masses. The largest nodule on the right is at 5:00 4 cm FN and there is a  nodule on the right at 11:00 4 cm FN for which biopsies are rec. The other nodules, ( R 12:00 N5 3 x 5 x 2 mm cyst, R 8:00 N5 8 x 5 x 8 mm hypoechoic mass, complicated cyst vs FA and L 8:00 N7 3 mm cyst) can be followed at 6 month intervals.   She underwent right breast 5:00 4cm FN and right breast 11:00 4 cm FN core biopsies on 7/12/21: the right breast 5:00 biopsy showed  atypical ductal hyperplasia involving a fibroadenoma, the right breast 11:00 biopsy showed a fibroadenoma.  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 Good compliance. She denies mood changes, wt gain, vaginal dryness, SOB, chest pain, leg swelling or clotting issues. Her energy and appetite is good.  Exercises: does 20 minutes daily on treadmill. Cutting down carbs. lost 6 lbs. Has 3 children   LMP date: 5/2022, Using contraception - condoms. Periods are irregular since tamoxifen. Will check FSH Estradiol . FSH 22, will continue angela until FSH > 25 GYN  2/2023 no pelvic sono in 2023 at that time 1/2022 pelvic sono WNL:  Dr Levin, [facility], . Breast imaging: with DR Forrester. Mammo/sono 7/2022, birads4. Bx benign  RIght breast US  2/2023 BI-RADS 3 Stable benign-appearing mass 8:00, right breast. Opthal: 6/2022, good exam [de-identified] : Ms. LASHON KHAN is a 49-year-old premenopausal female with PMH of HTN who is diagnosed with DCIS of the right breast stage 0  ( pTis, Nx, M na), 4 mm in size, present in 3/11 blocks, low grade, no necrosis and negative margins. ER/[place] status is positive. She is s/p right breast excisional biopsy on 9/23/21. Initial consult with me in October. Pt was recommended to see rad oncology. She cx appt with rad oncology, didn't notify me. She hasn't taken tamoxifen at all due to her fear about side effects. She is taking pomegranate vitamins. She doesn't want to get RT. After a long discussion of risks and benefits, pt agreeable to start tamoxifen. Started tamoxifen 2/9/22 9/23/24: Patient presents for 6-month follow-up. She continues to take Tamoxifen with good efficacy. She denies any significant s/e consistent with: fatigue, vaginal dryness, SOB, chest pain, leg swelling, pain, fatigue, and mood or sleep disturbances. LMP: very irregular; reports las time it was regular was prior to Tamoxifen Bilateral Diagnostic Mammogram and Bilateral Complete Breast US completed on 11/6/2023; BI-RADS 2 UTD with GYN: appointment scheduled for December 2024. RTC: 6 Months

## 2024-11-21 ENCOUNTER — APPOINTMENT (OUTPATIENT)
Dept: MAMMOGRAPHY | Facility: IMAGING CENTER | Age: 49
End: 2024-11-21

## 2024-11-21 ENCOUNTER — APPOINTMENT (OUTPATIENT)
Dept: ULTRASOUND IMAGING | Facility: IMAGING CENTER | Age: 49
End: 2024-11-21

## 2024-11-21 ENCOUNTER — OUTPATIENT (OUTPATIENT)
Dept: OUTPATIENT SERVICES | Facility: HOSPITAL | Age: 49
LOS: 1 days | End: 2024-11-21
Payer: COMMERCIAL

## 2024-11-21 ENCOUNTER — RESULT REVIEW (OUTPATIENT)
Age: 49
End: 2024-11-21

## 2024-11-21 DIAGNOSIS — D05.11 INTRADUCTAL CARCINOMA IN SITU OF RIGHT BREAST: ICD-10-CM

## 2024-11-21 DIAGNOSIS — Z98.891 HISTORY OF UTERINE SCAR FROM PREVIOUS SURGERY: Chronic | ICD-10-CM

## 2024-11-21 PROCEDURE — 77063 BREAST TOMOSYNTHESIS BI: CPT | Mod: 26

## 2024-11-21 PROCEDURE — 76641 ULTRASOUND BREAST COMPLETE: CPT | Mod: 26,50

## 2024-11-21 PROCEDURE — 77063 BREAST TOMOSYNTHESIS BI: CPT

## 2024-11-21 PROCEDURE — 77067 SCR MAMMO BI INCL CAD: CPT | Mod: 26

## 2024-11-21 PROCEDURE — 76641 ULTRASOUND BREAST COMPLETE: CPT

## 2024-11-21 PROCEDURE — 77067 SCR MAMMO BI INCL CAD: CPT

## 2025-03-26 ENCOUNTER — APPOINTMENT (OUTPATIENT)
Dept: HEMATOLOGY ONCOLOGY | Facility: CLINIC | Age: 50
End: 2025-03-26

## 2025-06-26 ENCOUNTER — OUTPATIENT (OUTPATIENT)
Dept: OUTPATIENT SERVICES | Facility: HOSPITAL | Age: 50
LOS: 1 days | Discharge: HOME | End: 2025-06-26

## 2025-06-26 DIAGNOSIS — D64.9 ANEMIA, UNSPECIFIED: ICD-10-CM

## 2025-06-26 DIAGNOSIS — Z98.891 HISTORY OF UTERINE SCAR FROM PREVIOUS SURGERY: Chronic | ICD-10-CM

## 2025-06-30 ENCOUNTER — APPOINTMENT (OUTPATIENT)
Dept: HEMATOLOGY ONCOLOGY | Facility: CLINIC | Age: 50
End: 2025-06-30
Payer: COMMERCIAL

## 2025-06-30 ENCOUNTER — NON-APPOINTMENT (OUTPATIENT)
Age: 50
End: 2025-06-30

## 2025-06-30 VITALS
TEMPERATURE: 97.3 F | BODY MASS INDEX: 23.18 KG/M2 | RESPIRATION RATE: 17 BRPM | WEIGHT: 118.05 LBS | OXYGEN SATURATION: 97 % | SYSTOLIC BLOOD PRESSURE: 117 MMHG | DIASTOLIC BLOOD PRESSURE: 76 MMHG | HEART RATE: 91 BPM | HEIGHT: 60 IN

## 2025-06-30 PROBLEM — R92.30 DENSE BREASTS: Status: ACTIVE | Noted: 2022-03-30

## 2025-06-30 PROCEDURE — 99214 OFFICE O/P EST MOD 30 MIN: CPT

## 2025-06-30 PROCEDURE — G2211 COMPLEX E/M VISIT ADD ON: CPT | Mod: NC
